# Patient Record
Sex: FEMALE | Race: WHITE | Employment: STUDENT | URBAN - METROPOLITAN AREA
[De-identification: names, ages, dates, MRNs, and addresses within clinical notes are randomized per-mention and may not be internally consistent; named-entity substitution may affect disease eponyms.]

---

## 2017-07-20 ENCOUNTER — APPOINTMENT (OUTPATIENT)
Dept: GENERAL RADIOLOGY | Facility: CLINIC | Age: 22
End: 2017-07-20
Attending: EMERGENCY MEDICINE
Payer: COMMERCIAL

## 2017-07-20 ENCOUNTER — HOSPITAL ENCOUNTER (EMERGENCY)
Facility: CLINIC | Age: 22
Discharge: HOME | End: 2017-07-21
Attending: EMERGENCY MEDICINE | Admitting: EMERGENCY MEDICINE
Payer: COMMERCIAL

## 2017-07-20 DIAGNOSIS — M25.522 LEFT ELBOW PAIN: ICD-10-CM

## 2017-07-20 DIAGNOSIS — T07.XXXA ABRASIONS OF MULTIPLE SITES: ICD-10-CM
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
## 2017-07-20 DIAGNOSIS — S09.90XA CLOSED HEAD INJURY, INITIAL ENCOUNTER: ICD-10-CM

## 2017-07-20 PROCEDURE — 25000132 ZZH RX MED GY IP 250 OP 250 PS 637: Performed by: EMERGENCY MEDICINE

## 2017-07-20 PROCEDURE — 73080 X-RAY EXAM OF ELBOW: CPT | Mod: LT

## 2017-07-20 PROCEDURE — 99282 EMERGENCY DEPT VISIT SF MDM: CPT | Mod: Z6 | Performed by: EMERGENCY MEDICINE

## 2017-07-20 PROCEDURE — 99283 EMERGENCY DEPT VISIT LOW MDM: CPT | Performed by: EMERGENCY MEDICINE

## 2017-07-20 PROCEDURE — 73090 X-RAY EXAM OF FOREARM: CPT | Mod: LT

## 2017-07-20 RX ORDER — ACETAMINOPHEN 325 MG/1
975 TABLET ORAL ONCE
Status: COMPLETED | OUTPATIENT
Start: 2017-07-20 | End: 2017-07-20

## 2017-07-20 RX ORDER — IBUPROFEN 600 MG/1
600 TABLET, FILM COATED ORAL ONCE
Status: COMPLETED | OUTPATIENT
Start: 2017-07-20 | End: 2017-07-20

## 2017-07-20 RX ADMIN — ACETAMINOPHEN 975 MG: 325 TABLET, FILM COATED ORAL at 23:17

## 2017-07-20 RX ADMIN — IBUPROFEN 600 MG: 600 TABLET ORAL at 23:17

## 2017-07-20 ASSESSMENT — ENCOUNTER SYMPTOMS
ABDOMINAL PAIN: 0
NAUSEA: 1
SHORTNESS OF BREATH: 0
WOUND: 1
FEVER: 0

## 2017-07-20 NOTE — ED AVS SNAPSHOT
[facility], [place], Emergency Department    [address] [place] [address]    Phone:  [phone]                                       Kiah Beaver   MRN: [id number]    Department:  [facility], Emergency Department   Date of Visit:  7/20/2017           After Visit Summary Signature Page     I have received my discharge instructions, and my questions have been answered. I have discussed any challenges I see with this plan with the nurse or doctor.    ..........................................................................................................................................  Patient/Patient Representative Signature      ..........................................................................................................................................  Patient Representative Print Name and Relationship to Patient    ..................................................               ................................................  Date                                            Time    ..........................................................................................................................................  Reviewed by Signature/Title    ...................................................              ..............................................  Date                                                            Time

## 2017-07-20 NOTE — ED AVS SNAPSHOT
[facility], Emergency Department    [address] [facility] [address]    Phone:  [phone]                                       Kiah Beaver   MRN: [id number]    Department:  [facility], Emergency Department   Date of Visit:  7/20/2017           Patient Information     Date Of Birth          1995        Your diagnoses for this visit were:     Closed head injury, initial encounter     Abrasions of multiple sites     Fall, initial encounter     Left elbow pain        You were seen by Jorge Mathew MD.        Discharge Instructions         Treatment for Mild Traumatic Brain Injury (Concussion)  A mild traumatic brain injury (TBI) is a sudden jolt to your head that causes a temporary change in the way your brain works. It could be caused by a blow to your head, a blast, or a sudden and severe movement of your head that bounces your brain inside your skull. Falls, fights, sports, and car accidents also can cause TBIs.  Treatment of mild TBI   Having a mild TBI can change the way you feel, act, move, and think. Even though you may look fine, a mild TBI can have a big impact on many areas of your life. A mild TBI can cause headaches, fatigue, memory problems, mood swings, and inability to focus your thoughts.  Treatment for mild TBI may be different, depending on symptoms and other unrelated medical issues; therefore, no 2 TBIs are the same. You may need to work with a TBI team -- a group of healthcare providers who help people recover from TBI. For example, you might work with a physical therapist to help with your balance and movement problems. Or you might work with an occupational therapist to help you function better at home and at work. Other medical experts may help you with emotional and thinking problems.  In some cases, your doctor may use medicine to ease symptoms while you recover. These may include pain relievers, antidepressants, antianxiety medicine, sleep aids, and muscle  relaxants. Although medicines can help, they are not a main part of treatment. You should not take any medicines unless discussed and approved by your healthcare provider. Things that you can do for yourself are usually as important as the medicines you are prescribed. This part of your treatment is called self-management.  Self-management for mild TBI  Most people with mild TBI recover completely, but it may take weeks or months. For some people, symptoms may continue for years. Because of this, self-management may continue long after you leave the hospital. Many lifestyle changes that help your brain recover are good habits that you should keep up even after you have recovered. Here are some tips:      Learn as much as you can about TBI. Share what you learn with friends and family.    Let your health care team know about all your symptoms.    Eat a healthy diet and drink plenty of fluids.    Get plenty of sleep.    Don t overexert yourself mentally or physically.    Don t smoke, take drugs, or drink alcohol.    Don t use caffeine or energy drinks as a way to make you feel less tired.    Avoid activities that could cause another jolt to your head. Don't return to sports or any activity that could cause you to hit your head until all symptoms are gone and you have been cleared by your doctor. A second head injury before fully recovering from the first one can lead to serious brain injury. Ask your health care provider what types of activities are safe.    Avoid mental stress. Learn some relaxation techniques like deep breathing.    Keep a pad and pencil handy. Write things down if you are having trouble concentrating or remembering.  Let your healthcare provider know if your symptoms are getting worse. And look out for other important mental symptoms. These include memory loss, having a hard time thinking clearly, having trouble controlling your emotions, and depression. Also be sure to report physical symptoms  such as worsening headaches, loss of balance, changes in vision, seizures, and vomiting.  Recovery from a mild TBI takes time. Be patient and give your brain time to heal. Be sure to rely on your support system, which includes friends and family members who understand what you are going through. You might also want to join a support group and share your feelings with others who have had a TBI.    Date Last Reviewed: 8/17/2015 2000-2017 [employer]. [address] [place] [address], [place] [address]. All rights reserved. This information is not intended as a substitute for professional medical care. Always follow your healthcare professional's instructions.          Upper Extremity Contusion  You have a contusion (bruise) of an upper extremity (arm, wrist, hand, or fingers). Symptoms include pain, swelling, and skin discoloration. No bones are broken. This injury may take from a few days to a few weeks to heal.  During that time, the bruise may change from reddish in color, to purple-blue, to green-yellow, to yellow-brown.  Home care    Unless another medication was prescribed, you can take acetaminophen, ibuprofen, or naproxen to control pain. (If you have chronic liver or kidney disease or ever had a stomach ulcer or GI bleeding, talk with your doctor before using these medicines.)    Elevate the injured area to reduce pain and swelling. As much as possible, sit or lie down with the injured area raised about the level of your heart. This is especially important during the first 48 hours.    Ice the injured area to help reduce pain and swelling. Wrap a cold source (ice pack or ice cubes in a plastic bag) in a thin towel. Apply to the bruised area for 20 minutes every 1 to 2 hours the first day. Continue this 3 to 4 times a day until the pain and swelling goes away.    If a sling was provided, you may remove it to shower or bathe. To prevent joint stiffness, do not wear it for more than one week.  Follow  up  Follow up with your health care provider or our staff as advised. Call if you are not improving within the next 1 to 2 weeks.  When to seek medical advice   Call your health care provider right away if any of these occur:    Increased pain or swelling    Hand or fingers become cold, blue, numb or tingly    Signs of infection: Warmth, drainage, or increased redness or pain around the injury    Inability to move the injured body part     Frequent bruising for unknown reasons  Date Last Reviewed: 4/29/2015 2000-2017 [employer]. [address] [place] [address]. All rights reserved. This information is not intended as a substitute for professional medical care. Always follow your healthcare professional's instructions.          R.I.C.E.    R.I.C.E. stands for Rest, Ice, Compression, and Elevation. Doing these things helps limit pain and swelling after an injury. R.I.C.E. also helps injuries heal faster. Use R.I.C.E. for sprains, strains, and severe bruises or bumps. Follow the tips on this handout and begin R.I.C.E. as soon as possible after an injury.  ? Rest  Pain is your body s way of telling you to rest an injured area. Whether you have hurt an elbow, hand, foot, or knee, limiting its use will prevent further injury and help you heal.  ? Ice  Applying ice right after an injury helps prevent swelling and reduce pain. Don t place ice directly on your skin.    Wrap a cold pack or bag of ice in a thin cloth. Place it over the injured area.    Ice for 10 minutes every 3 hours. Don t ice for more than 20 minutes at a time.  ? Compression  Putting pressure (compression) on an injury helps prevent swelling and provides support.    Wrap the injured area firmly with an elastic bandage. If your hand or foot tingles, becomes discolored, or feels cold to the touch, the bandage may be too tight. Rewrap it more loosely.    If your bandage becomes too loose, rewrap it.    Do not wear an elastic  bandage overnight.  ? Elevation  Keeping an injury elevated helps reduce swelling, pain, and throbbing. Elevation is most effective when the injury is kept elevated higher than the heart.     Call your healthcare provider if you notice any of the following:    Fingers or toes feel numb, are cold to the touch, or change color    Skin looks shiny or tight    Pain, swelling, or bruising worsens and is not improved with elevation   Date Last Reviewed: 9/3/2015    6935-2275 The "[employer]. [address] [place] [address], [place] [address]. All rights reserved. This information is not intended as a substitute for professional medical care. Always follow your healthcare professional's instructions.          24 Hour Appointment Hotline       To make an appointment at any [facility], call [phone] ([phone]). If you don't have a family doctor or clinic, we will help you find one. [facility] are conveniently located to serve the needs of you and your family.             Review of your medicines      Our records show that you are taking the medicines listed below. If these are incorrect, please call your family doctor or clinic.        Dose / Directions Last dose taken    EFFEXOR PO   Dose:  150 mg        Take 150 mg by mouth daily   Refills:  0        TRI-ESTARYLLA PO        Refills:  0        XANAX PO   Dose:  0.25 mg        Take 0.25 mg by mouth every 4 hours as needed for anxiety   Refills:  0                Procedures and tests performed during your visit     Elbow  XR, G/E 3 views, left    Radius/Ulna XR,  PA &LAT, left      Orders Needing Specimen Collection     None      Pending Results     Date and Time Order Name Status Description    7/20/2017 2235 Radius/Ulna XR,  PA &LAT, left Preliminary     7/20/2017 2235 Elbow  XR, G/E 3 views, left Preliminary             Pending Culture Results     No orders found for last 3 day(s).            Pending Results Instructions     If you had any lab  "results that were not finalized at the time of your Discharge, you can call the ED Lab Result RN at [phone]. You will be contacted by this team for any positive Lab results or changes in treatment. The nurses are available 7 days a week from 10A to 6:30P.  You can leave a message 24 hours per day and they will return your call.        Thank you for choosing [facility]       Thank you for choosing [facility] for your care. Our goal is always to provide you with excellent care. Hearing back from our patients is one way we can continue to improve our services. Please take a few minutes to complete the written survey that you may receive in the mail after you visit with us. Thank you!        [employer] Information     [employer] lets you send messages to your doctor, view your test results, renew your prescriptions, schedule appointments and more. To sign up, go to [email or web address] . Click on \"Log in\" on the left side of the screen, which will take you to the Welcome page. Then click on \"Sign up Now\" on the right side of the page.     You will be asked to enter the access code listed below, as well as some personal information. Please follow the directions to create your username and password.     Your access code is: [email or web address]  Expires: 10/18/2017 11:59 PM     Your access code will  in 90 days. If you need help or a new code, please call your [facility] clinic or [phone].        Care EveryWhere ID     This is your Care EveryWhere ID. This could be used by other organizations to access your [facility] medical records  [id number]        Equal Access to Services     [facility] CenterJENNIFER : Hadii rema Ballard, waaxda luqadaha, qaybta kaalluis pfeiffer . So [facility] [phone].    ATENCIÓN: Si habla español, tiene a rod disposición servicios gratuitos de asistencia lingüística. Llame al [phone].    We comply with applicable federal civil rights laws and [place] " laws. We do not discriminate on the basis of race, color, national origin, age, disability sex, sexual orientation or gender identity.            After Visit Summary       This is your record. Keep this with you and show to your community pharmacist(s) and doctor(s) at your next visit.

## 2017-07-21 VITALS
TEMPERATURE: 98 F | WEIGHT: 126.54 LBS | SYSTOLIC BLOOD PRESSURE: 115 MMHG | HEIGHT: 64 IN | RESPIRATION RATE: 16 BRPM | OXYGEN SATURATION: 96 % | HEART RATE: 86 BPM | DIASTOLIC BLOOD PRESSURE: 81 MMHG | BODY MASS INDEX: 21.6 KG/M2

## 2017-07-21 NOTE — DISCHARGE INSTRUCTIONS
Treatment for Mild Traumatic Brain Injury (Concussion)  A mild traumatic brain injury (TBI) is a sudden jolt to your head that causes a temporary change in the way your brain works. It could be caused by a blow to your head, a blast, or a sudden and severe movement of your head that bounces your brain inside your skull. Falls, fights, sports, and car accidents also can cause TBIs.  Treatment of mild TBI   Having a mild TBI can change the way you feel, act, move, and think. Even though you may look fine, a mild TBI can have a big impact on many areas of your life. A mild TBI can cause headaches, fatigue, memory problems, mood swings, and inability to focus your thoughts.  Treatment for mild TBI may be different, depending on symptoms and other unrelated medical issues; therefore, no 2 TBIs are the same. You may need to work with a TBI team -- a group of healthcare providers who help people recover from TBI. For example, you might work with a physical therapist to help with your balance and movement problems. Or you might work with an occupational therapist to help you function better at home and at work. Other medical experts may help you with emotional and thinking problems.  In some cases, your doctor may use medicine to ease symptoms while you recover. These may include pain relievers, antidepressants, antianxiety medicine, sleep aids, and muscle relaxants. Although medicines can help, they are not a main part of treatment. You should not take any medicines unless discussed and approved by your healthcare provider. Things that you can do for yourself are usually as important as the medicines you are prescribed. This part of your treatment is called self-management.  Self-management for mild TBI  Most people with mild TBI recover completely, but it may take weeks or months. For some people, symptoms may continue for years. Because of this, self-management may continue long after you leave the hospital. Many  lifestyle changes that help your brain recover are good habits that you should keep up even after you have recovered. Here are some tips:      Learn as much as you can about TBI. Share what you learn with friends and family.    Let your health care team know about all your symptoms.    Eat a healthy diet and drink plenty of fluids.    Get plenty of sleep.    Don t overexert yourself mentally or physically.    Don t smoke, take drugs, or drink alcohol.    Don t use caffeine or energy drinks as a way to make you feel less tired.    Avoid activities that could cause another jolt to your head. Don't return to sports or any activity that could cause you to hit your head until all symptoms are gone and you have been cleared by your doctor. A second head injury before fully recovering from the first one can lead to serious brain injury. Ask your health care provider what types of activities are safe.    Avoid mental stress. Learn some relaxation techniques like deep breathing.    Keep a pad and pencil handy. Write things down if you are having trouble concentrating or remembering.  Let your healthcare provider know if your symptoms are getting worse. And look out for other important mental symptoms. These include memory loss, having a hard time thinking clearly, having trouble controlling your emotions, and depression. Also be sure to report physical symptoms such as worsening headaches, loss of balance, changes in vision, seizures, and vomiting.  Recovery from a mild TBI takes time. Be patient and give your brain time to heal. Be sure to rely on your support system, which includes friends and family members who understand what you are going through. You might also want to join a support group and share your feelings with others who have had a TBI.    Date Last Reviewed: 8/17/2015 2000-2017 "[employer]. [address] [place] [address], [place] [address]. All rights reserved. This information is not intended as a  substitute for professional medical care. Always follow your healthcare professional's instructions.          Upper Extremity Contusion  You have a contusion (bruise) of an upper extremity (arm, wrist, hand, or fingers). Symptoms include pain, swelling, and skin discoloration. No bones are broken. This injury may take from a few days to a few weeks to heal.  During that time, the bruise may change from reddish in color, to purple-blue, to green-yellow, to yellow-brown.  Home care    Unless another medication was prescribed, you can take acetaminophen, ibuprofen, or naproxen to control pain. (If you have chronic liver or kidney disease or ever had a stomach ulcer or GI bleeding, talk with your doctor before using these medicines.)    Elevate the injured area to reduce pain and swelling. As much as possible, sit or lie down with the injured area raised about the level of your heart. This is especially important during the first 48 hours.    Ice the injured area to help reduce pain and swelling. Wrap a cold source (ice pack or ice cubes in a plastic bag) in a thin towel. Apply to the bruised area for 20 minutes every 1 to 2 hours the first day. Continue this 3 to 4 times a day until the pain and swelling goes away.    If a sling was provided, you may remove it to shower or bathe. To prevent joint stiffness, do not wear it for more than one week.  Follow up  Follow up with your health care provider or our staff as advised. Call if you are not improving within the next 1 to 2 weeks.  When to seek medical advice   Call your health care provider right away if any of these occur:    Increased pain or swelling    Hand or fingers become cold, blue, numb or tingly    Signs of infection: Warmth, drainage, or increased redness or pain around the injury    Inability to move the injured body part     Frequent bruising for unknown reasons  Date Last Reviewed: 4/29/2015 2000-2017 [employer]. [address] [facility],  [place] [address]. All rights reserved. This information is not intended as a substitute for professional medical care. Always follow your healthcare professional's instructions.          R.I.C.E.    R.I.C.E. stands for Rest, Ice, Compression, and Elevation. Doing these things helps limit pain and swelling after an injury. R.I.C.E. also helps injuries heal faster. Use R.I.C.E. for sprains, strains, and severe bruises or bumps. Follow the tips on this handout and begin R.I.C.E. as soon as possible after an injury.  ? Rest  Pain is your body s way of telling you to rest an injured area. Whether you have hurt an elbow, hand, foot, or knee, limiting its use will prevent further injury and help you heal.  ? Ice  Applying ice right after an injury helps prevent swelling and reduce pain. Don t place ice directly on your skin.    Wrap a cold pack or bag of ice in a thin cloth. Place it over the injured area.    Ice for 10 minutes every 3 hours. Don t ice for more than 20 minutes at a time.  ? Compression  Putting pressure (compression) on an injury helps prevent swelling and provides support.    Wrap the injured area firmly with an elastic bandage. If your hand or foot tingles, becomes discolored, or feels cold to the touch, the bandage may be too tight. Rewrap it more loosely.    If your bandage becomes too loose, rewrap it.    Do not wear an elastic bandage overnight.  ? Elevation  Keeping an injury elevated helps reduce swelling, pain, and throbbing. Elevation is most effective when the injury is kept elevated higher than the heart.     Call your healthcare provider if you notice any of the following:    Fingers or toes feel numb, are cold to the touch, or change color    Skin looks shiny or tight    Pain, swelling, or bruising worsens and is not improved with elevation   Date Last Reviewed: 9/3/2015    8891-6358 [employer]. [address] [place] [address], [place] [address]. All rights reserved. This information  is not intended as a substitute for professional medical care. Always follow your healthcare professional's instructions.
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
## 2017-08-10 ENCOUNTER — HOSPITAL ENCOUNTER (EMERGENCY)
Facility: CLINIC | Age: 22
Discharge: HOME | End: 2017-08-10
Attending: EMERGENCY MEDICINE | Admitting: EMERGENCY MEDICINE
Payer: COMMERCIAL

## 2017-08-10 VITALS
TEMPERATURE: 97 F | SYSTOLIC BLOOD PRESSURE: 122 MMHG | DIASTOLIC BLOOD PRESSURE: 77 MMHG | HEART RATE: 77 BPM | WEIGHT: 127.2 LBS | RESPIRATION RATE: 20 BRPM | OXYGEN SATURATION: 99 % | BODY MASS INDEX: 21.72 KG/M2 | HEIGHT: 64 IN

## 2017-08-10 DIAGNOSIS — F32.2 MDD (MAJOR DEPRESSIVE DISORDER), SEVERE (H): ICD-10-CM

## 2017-08-10 LAB
AMPHETAMINES UR QL SCN: NORMAL
BARBITURATES UR QL: NORMAL
BENZODIAZ UR QL: NORMAL
CANNABINOIDS UR QL SCN: NORMAL
COCAINE UR QL: NORMAL
ETHANOL UR QL SCN: NORMAL
HCG UR QL: NEGATIVE
OPIATES UR QL SCN: NORMAL

## 2017-08-10 PROCEDURE — 80307 DRUG TEST PRSMV CHEM ANLYZR: CPT | Performed by: FAMILY MEDICINE

## 2017-08-10 PROCEDURE — 90791 PSYCH DIAGNOSTIC EVALUATION: CPT

## 2017-08-10 PROCEDURE — 99284 EMERGENCY DEPT VISIT MOD MDM: CPT | Mod: Z6 | Performed by: EMERGENCY MEDICINE

## 2017-08-10 PROCEDURE — 80320 DRUG SCREEN QUANTALCOHOLS: CPT | Performed by: FAMILY MEDICINE

## 2017-08-10 PROCEDURE — 81025 URINE PREGNANCY TEST: CPT | Performed by: FAMILY MEDICINE

## 2017-08-10 PROCEDURE — 99285 EMERGENCY DEPT VISIT HI MDM: CPT | Mod: 25

## 2017-08-10 ASSESSMENT — ENCOUNTER SYMPTOMS
SHORTNESS OF BREATH: 0
FEVER: 0
ABDOMINAL PAIN: 0

## 2017-08-10 NOTE — ED AVS SNAPSHOT
[facility], Emergency Department    [address]    [place] [address]    Phone:  [phone]    Fax:  [phone]                                       Kiah Beaver   MRN: [id number]    Department:  [facility], Emergency Department   Date of Visit:  8/10/2017           Patient Information     Date Of Birth          1995        Your diagnoses for this visit were:     MDD (major depressive disorder), severe (H)        You were seen by Landen Toro MD.        Discharge Instructions         Depression  Depression is one of the most common mental health problems today. It is not just a state of unhappiness or sadness. It is a true disease. The cause seems to be related to a decrease in chemicals that transmit signals in the brain. Having a family history of depression, alcoholism, or suicide increases the risk. Chronic illness, chronic pain, migraine headaches and high emotional stress also increase the risk.  Depression is something we tend to recognize in others, but may have a hard time seeing in ourselves. It can show in many physical and emotional ways:    Loss of appetite    Over-eating    Not being able to sleep    Sleeping too much    Tiredness not related to physical exertion    Restlessness or irritability    Slowness of movement or speech    Feeling depressed or withdrawn    Loss of interest in things you once enjoyed    Trouble concentrating, poor memory, trouble making decisions    Thoughts of harming or killing oneself, or thoughts that life is not worth living    Low self-esteem  The treatment for depression may include both medicine and psychotherapy. Antidepressants can reduce suffering and can improve the ability to function during the depressed period. Therapy can offer emotional support and help you understand emotional factors that may be causing the depression.  Home care    On-going care and support helps people manage this disease.  Find a healthcare provider and  therapist who meet your needs. Seek help when you feel like you may be getting ill.    Be kind to yourself. Make it a point to do things that you enjoy (gardening, walking in nature, going to a movie, etc.). Reward yourself for small successes.    Take care of your physical body. Eat a balanced diet (low in saturated fat and high in fruits and vegetables). Exercise at least 3 times a week for 30 minutes. Even mild-moderate exercise (like brisk walking) can make you feel better.    Avoid alcohol, which can make depression worse.    Take medicine as prescribed.    Tell each of your healthcare providers about all of the prescription drugs, over-the-counter medicines, vitamins, and supplements you take. Certain supplements interact with medicines and can result in dangerous side effects. Ask your pharmacist when you have questions about drug interactions.    Talk with your family and trusted friends about your feelings and thoughts. Ask them to help you recognize behavior changes early so you can get help and, if needed, medicine can be adjusted.  Follow-up care  Follow up with your healthcare provider, or as advised.  Call 911  Call 911 if you:    Have suicidal thoughts, a suicide plan, and the means to carry out the plan    Have trouble breathing    Are very confused    Feel very drowsy or have trouble awakening    Faint or lose consciousness    Have new chest pain that becomes more severe, lasts longer, or spreads into your shoulder, arm, neck, jaw or back  When to seek medical advice  Call your healthcare provider right away if any of these occur:    Feeling extreme depression, fear, anxiety, or anger toward yourself or others    Feeling out of control    Feeling that you may try to harm yourself or another    Hearing voices that others do not hear    Seeing things that others do not see    Can t sleep or eat for 3 days in a row    Friends or family express concern over your behavior and ask you to seek help  Date  Last Reviewed: 9/29/2015 2000-2017 [employer] [address] [place] [address], [place] [address]. All rights reserved. This information is not intended as a substitute for professional medical care. Always follow your healthcare professional's instructions.          24 Hour Appointment Hotline       To make an appointment at any [facility] clinic, call [phone] ([phone]). If you don't have a family doctor or clinic, we will help you find one. [facility] clinics are conveniently located to serve the needs of you and your family.             Review of your medicines      Our records show that you are taking the medicines listed below. If these are incorrect, please call your family doctor or clinic.        Dose / Directions Last dose taken    EFFEXOR PO   Dose:  225 mg        Take 225 mg by mouth daily   Refills:  0        TRI-ESTARYLLA PO        Refills:  0        XANAX PO   Dose:  0.25 mg        Take 0.25 mg by mouth every 4 hours as needed for anxiety   Refills:  0                Procedures and tests performed during your visit     Drug abuse screen 6 urine (tox)    HCG qualitative urine      Orders Needing Specimen Collection     None      Pending Results     No orders found from 8/8/2017 to 8/11/2017.            Pending Culture Results     No orders found from 8/8/2017 to 8/11/2017.            Pending Results Instructions     If you had any lab results that were not finalized at the time of your Discharge, you can call the ED Lab Result RN at [phone]. You will be contacted by this team for any positive Lab results or changes in treatment. The nurses are available 7 days a week from 10A to 6:30P.  You can leave a message 24 hours per day and they will return your call.        Thank you for choosing [facility]       Thank you for choosing [facility] for your care. Our goal is always to provide you with excellent care. Hearing back from our patients is one way we can continue to improve our  "services. Please take a few minutes to complete the written survey that you may receive in the mail after you visit with us. Thank you!        [facility] [employer] Information     [employer] lets you send messages to your doctor, view your test results, renew your prescriptions, schedule appointments and more. To sign up, go to [email or web address] . Click on \"Log in\" on the left side of the screen, which will take you to the Welcome page. Then click on \"Sign up Now\" on the right side of the page.     You will be asked to enter the access code listed below, as well as some personal information. Please follow the directions to create your username and password.     Your access code is: [email or web address]  Expires: 10/18/2017 11:59 PM     Your access code will  in 90 days. If you need help or a new code, please call your [facility] clinic or [phone].        Care EveryWhere ID     This is your Care EveryWhere ID. This could be used by other organizations to access your [facility] medical records  [id number]        Equal Access to Services     [facility] MedicineJENNIFER : Hadii rema Ballard, waaxda luberthaadaha, qaybta kaalmarzena morris, luis morrow . So [facility] [phone].    ATENCIÓN: Si habla español, tiene a rod disposición servicios gratuitos de asistencia lingüística. Leena al [phone].    We comply with applicable federal civil rights laws and [place] laws. We do not discriminate on the basis of race, color, national origin, age, disability sex, sexual orientation or gender identity.            After Visit Summary       This is your record. Keep this with you and show to your community pharmacist(s) and doctor(s) at your next visit.                  "
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
## 2017-08-10 NOTE — ED AVS SNAPSHOT
[facility], [place], Emergency Department    [address]    [facility] [address]    Phone:  [phone]    Fax:  [phone]                                       Kiah Beaver   MRN: [id number]    Department:  [facility], Emergency Department   Date of Visit:  8/10/2017           After Visit Summary Signature Page     I have received my discharge instructions, and my questions have been answered. I have discussed any challenges I see with this plan with the nurse or doctor.    ..........................................................................................................................................  Patient/Patient Representative Signature      ..........................................................................................................................................  Patient Representative Print Name and Relationship to Patient    ..................................................               ................................................  Date                                            Time    ..........................................................................................................................................  Reviewed by Signature/Title    ...................................................              ..............................................  Date                                                            Time

## 2017-08-10 NOTE — DISCHARGE INSTRUCTIONS

## 2017-08-29 ENCOUNTER — HOSPITAL ENCOUNTER (EMERGENCY)
Facility: CLINIC | Age: 22
Discharge: HOME | End: 2017-08-29
Attending: EMERGENCY MEDICINE | Admitting: EMERGENCY MEDICINE
Payer: COMMERCIAL

## 2017-08-29 VITALS
WEIGHT: 125 LBS | SYSTOLIC BLOOD PRESSURE: 131 MMHG | BODY MASS INDEX: 21.34 KG/M2 | DIASTOLIC BLOOD PRESSURE: 94 MMHG | OXYGEN SATURATION: 100 % | HEART RATE: 95 BPM | RESPIRATION RATE: 16 BRPM | HEIGHT: 64 IN | TEMPERATURE: 98 F

## 2017-08-29 DIAGNOSIS — T74.21XA SEXUAL ASSAULT OF ADULT, INITIAL ENCOUNTER: ICD-10-CM

## 2017-08-29 PROCEDURE — 99285 EMERGENCY DEPT VISIT HI MDM: CPT

## 2017-08-29 PROCEDURE — 99283 EMERGENCY DEPT VISIT LOW MDM: CPT | Mod: Z6 | Performed by: EMERGENCY MEDICINE

## 2017-08-29 ASSESSMENT — ENCOUNTER SYMPTOMS
FEVER: 0
SHORTNESS OF BREATH: 0
ABDOMINAL PAIN: 0
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
## 2017-08-29 NOTE — ED NOTES
Pt verbalized understanding of all d/c instructions. Pt reported she felt safe d/c'ing home. Pt is being driven home by . SARs RN has okay'd d/c of patient per her stand point.

## 2017-08-29 NOTE — ED AVS SNAPSHOT
[facility], [place], Emergency Department    [address] [place] [address]    Phone:  [phone]                                       Kiah Beaver   MRN: [id number]    Department:  [facility], Emergency Department   Date of Visit:  8/29/2017           After Visit Summary Signature Page     I have received my discharge instructions, and my questions have been answered. I have discussed any challenges I see with this plan with the nurse or doctor.    ..........................................................................................................................................  Patient/Patient Representative Signature      ..........................................................................................................................................  Patient Representative Print Name and Relationship to Patient    ..................................................               ................................................  Date                                            Time    ..........................................................................................................................................  Reviewed by Signature/Title    ...................................................              ..............................................  Date                                                            Time

## 2017-09-29 ENCOUNTER — HOSPITAL ENCOUNTER (EMERGENCY)
Facility: CLINIC | Age: 22
Discharge: HOME | End: 2017-09-29
Attending: EMERGENCY MEDICINE | Admitting: EMERGENCY MEDICINE
Payer: COMMERCIAL

## 2017-09-29 VITALS
TEMPERATURE: 98.7 F | SYSTOLIC BLOOD PRESSURE: 118 MMHG | RESPIRATION RATE: 16 BRPM | BODY MASS INDEX: 21.15 KG/M2 | WEIGHT: 123.9 LBS | DIASTOLIC BLOOD PRESSURE: 84 MMHG | HEIGHT: 64 IN | HEART RATE: 89 BPM | OXYGEN SATURATION: 98 %

## 2017-09-29 DIAGNOSIS — M79.10 MUSCLE TENDERNESS: ICD-10-CM

## 2017-09-29 DIAGNOSIS — R30.0 DYSURIA: ICD-10-CM

## 2017-09-29 LAB
ALBUMIN UR-MCNC: NEGATIVE MG/DL
AMORPH CRY #/AREA URNS HPF: ABNORMAL /HPF
APPEARANCE UR: ABNORMAL
BACTERIA #/AREA URNS HPF: ABNORMAL /HPF
BILIRUB UR QL STRIP: NEGATIVE
COLOR UR AUTO: ABNORMAL
GLUCOSE UR STRIP-MCNC: NEGATIVE MG/DL
HCG UR QL: NEGATIVE
HGB UR QL STRIP: NEGATIVE
KETONES UR STRIP-MCNC: NEGATIVE MG/DL
LEUKOCYTE ESTERASE UR QL STRIP: ABNORMAL
MUCOUS THREADS #/AREA URNS LPF: PRESENT /LPF
NITRATE UR QL: NEGATIVE
PH UR STRIP: 7 PH (ref 5–7)
RBC #/AREA URNS AUTO: <1 /HPF (ref 0–2)
SOURCE: ABNORMAL
SP GR UR STRIP: 1.01 (ref 1–1.03)
SPECIMEN SOURCE: NORMAL
SQUAMOUS #/AREA URNS AUTO: 2 /HPF (ref 0–1)
UROBILINOGEN UR STRIP-MCNC: NORMAL MG/DL (ref 0–2)
WBC #/AREA URNS AUTO: <1 /HPF (ref 0–2)
WET PREP SPEC: NORMAL

## 2017-09-29 PROCEDURE — 81025 URINE PREGNANCY TEST: CPT | Performed by: EMERGENCY MEDICINE

## 2017-09-29 PROCEDURE — 81001 URINALYSIS AUTO W/SCOPE: CPT | Performed by: EMERGENCY MEDICINE

## 2017-09-29 PROCEDURE — 99284 EMERGENCY DEPT VISIT MOD MDM: CPT | Mod: Z6 | Performed by: EMERGENCY MEDICINE

## 2017-09-29 PROCEDURE — 99284 EMERGENCY DEPT VISIT MOD MDM: CPT | Mod: 25

## 2017-09-29 PROCEDURE — 87491 CHLMYD TRACH DNA AMP PROBE: CPT | Performed by: EMERGENCY MEDICINE

## 2017-09-29 PROCEDURE — 25000128 H RX IP 250 OP 636: Performed by: EMERGENCY MEDICINE

## 2017-09-29 PROCEDURE — 87088 URINE BACTERIA CULTURE: CPT | Performed by: EMERGENCY MEDICINE

## 2017-09-29 PROCEDURE — 96361 HYDRATE IV INFUSION ADD-ON: CPT | Mod: 59

## 2017-09-29 PROCEDURE — 87210 SMEAR WET MOUNT SALINE/INK: CPT | Performed by: EMERGENCY MEDICINE

## 2017-09-29 PROCEDURE — 96375 TX/PRO/DX INJ NEW DRUG ADDON: CPT

## 2017-09-29 PROCEDURE — 87086 URINE CULTURE/COLONY COUNT: CPT | Performed by: EMERGENCY MEDICINE

## 2017-09-29 PROCEDURE — 96374 THER/PROPH/DIAG INJ IV PUSH: CPT

## 2017-09-29 PROCEDURE — 87186 SC STD MICRODIL/AGAR DIL: CPT | Performed by: EMERGENCY MEDICINE

## 2017-09-29 PROCEDURE — 87591 N.GONORRHOEAE DNA AMP PROB: CPT | Performed by: EMERGENCY MEDICINE

## 2017-09-29 RX ORDER — KETOROLAC TROMETHAMINE 30 MG/ML
30 INJECTION, SOLUTION INTRAMUSCULAR; INTRAVENOUS ONCE
Status: COMPLETED | OUTPATIENT
Start: 2017-09-29 | End: 2017-09-29

## 2017-09-29 RX ORDER — SULFAMETHOXAZOLE AND TRIMETHOPRIM 400; 80 MG/1; MG/1
1 TABLET ORAL 2 TIMES DAILY
COMMUNITY
End: 2018-02-26

## 2017-09-29 RX ORDER — ETONOGESTREL AND ETHINYL ESTRADIOL VAGINAL RING .015; .12 MG/D; MG/D
1 RING VAGINAL
COMMUNITY

## 2017-09-29 RX ORDER — PROMETHAZINE HYDROCHLORIDE 25 MG/ML
12.5 INJECTION, SOLUTION INTRAMUSCULAR; INTRAVENOUS ONCE
Status: COMPLETED | OUTPATIENT
Start: 2017-09-29 | End: 2017-09-29

## 2017-09-29 RX ADMIN — PROMETHAZINE HYDROCHLORIDE 12.5 MG: 25 INJECTION INTRAMUSCULAR; INTRAVENOUS at 15:49

## 2017-09-29 RX ADMIN — KETOROLAC TROMETHAMINE 30 MG: 30 INJECTION, SOLUTION INTRAMUSCULAR at 15:51

## 2017-09-29 RX ADMIN — SODIUM CHLORIDE 1000 ML: 9 INJECTION, SOLUTION INTRAVENOUS at 15:49

## 2017-09-29 ASSESSMENT — ENCOUNTER SYMPTOMS
SORE THROAT: 1
ABDOMINAL PAIN: 0
VOMITING: 0
RHINORRHEA: 0
HEADACHES: 1
FREQUENCY: 1
COUGH: 0
NAUSEA: 1
DYSURIA: 1
FEVER: 0

## 2017-09-29 NOTE — ED PROVIDER NOTES
History     Chief Complaint   Patient presents with     Abdominal Pain     Urinary Frequency     Dysuria     Headache     HPI  Kiah Beaver is a 22 year old female with a history of depression and anxiety who presents to the Emergency Department for evaluation of flank pain and dysuria. The patient reports she was recently treated for a UTI with a 5 day course of antibiotics and ended the antibiotics 5 days ago. She is unsure what antibiotic it was and was treated at the [facility] as she is a student at the [employer]. She states her UTI symptoms of dysuria and frequency never fully resolved and she also developed bilateral flank pain with headaches and nausea three days ago. She says she has not been eating very well, but has been drinking lots of fluids. She also states she has recently had mild sore throat. She denies any vomiting, sore throat, rash, chest pain, abdominal pain, congestion, or cough.    Past Medical History:   Diagnosis Date     Anxiety      Depressive disorder      Hearing loss     bilateral     UTI (urinary tract infection)        Past Surgical History:   Procedure Laterality Date     ENT SURGERY      wisdom teeth extraction       Family History   Problem Relation Age of Onset     Bipolar Disorder Maternal Grandmother      Depression Paternal Aunt      Anxiety Disorder Paternal Aunt      Substance Abuse Paternal Aunt      Depression Paternal Aunt      Anxiety Disorder Paternal Aunt      Depression Maternal Uncle      Anxiety Disorder Maternal Uncle      Suicide Maternal Uncle      Substance Abuse Maternal Uncle        Social History   Substance Use Topics     Smoking status: Never Smoker     Smokeless tobacco: Never Used     Alcohol use Yes      Comment: rare       No current facility-administered medications for this encounter.      Current Outpatient Prescriptions   Medication     etonogestrel-ethinyl estradiol (NUVARING) 0.12-0.015 MG/24HR vaginal ring     HYDROXYZINE HCL PO      "AMITRIPTYLINE HCL PO     Venlafaxine HCl (EFFEXOR PO)     sulfamethoxazole-trimethoprim (BACTRIM/SEPTRA) 400-80 MG per tablet     ALPRAZolam (XANAX PO)        Allergies   Allergen Reactions     Ciprofloxacin Nausea     Flu Virus Vaccine      Lamictal [Lamotrigine] Rash         I have reviewed the Medications, Allergies, Past Medical and Surgical History, and Social History in the Epic system.    Review of Systems   Constitutional: Negative for fever.   HENT: Positive for sore throat. Negative for congestion and rhinorrhea.    Respiratory: Negative for cough.    Cardiovascular: Negative for chest pain.   Gastrointestinal: Positive for nausea. Negative for abdominal pain and vomiting.   Genitourinary: Positive for dysuria and frequency.   Skin: Negative for rash.   Neurological: Positive for headaches.   All other systems reviewed and are negative.      Physical Exam   BP: 120/82  Pulse: 91  Temp: 98.7  F (37.1  C)  Resp: 16  Height: 162.6 cm (5' 4\")  Weight: 56.2 kg (123 lb 14.4 oz)  SpO2: 97 %  Physical Exam   Constitutional: She is oriented to person, place, and time.   Young adult female, veery anxious, tearful initially but then during interview she was able to stop crying and answered questions appropriately   HENT:   Head: Normocephalic and atraumatic.   Mouth/Throat: Oropharynx is clear and moist. No oropharyngeal exudate.   Eyes: Pupils are equal, round, and reactive to light. No scleral icterus.   Neck: Normal range of motion. Neck supple.   No meningeal signs, normal ROM   Cardiovascular: Normal rate, regular rhythm, normal heart sounds and intact distal pulses.    No murmur heard.  Pulmonary/Chest: Effort normal and breath sounds normal. No respiratory distress.   Abdominal: Soft. Bowel sounds are normal. She exhibits no distension. There is no tenderness. There is no rebound and no guarding.   Genitourinary:   Genitourinary Comments: Pelvic exam: normal external female genitalia.  Spec exam shows no sign " of cervicitis, no bleeding, no purulent discharge.  No CMT. Normally sized uterus on bimanual exam. No unusual TTP upon bimanual exam.   Musculoskeletal: She exhibits no edema or tenderness.   No midline back TTP.  Some TTP over lumbar paraspinous regions B. No focal CVA TTP   Neurological: She is alert and oriented to person, place, and time. She has normal strength. No cranial nerve deficit or sensory deficit. Coordination normal. GCS eye subscore is 4. GCS verbal subscore is 5. GCS motor subscore is 6.   Skin: Skin is warm. No rash noted. She is not diaphoretic.   Psychiatric:   anxious   Nursing note and vitals reviewed.      ED Course     ED Course       Procedures             Critical Care time:  none             Results for orders placed or performed during the hospital encounter of 09/29/17 (from the past 24 hour(s))   Routine UA with microscopic   Result Value Ref Range    Color Urine Light Yellow     Appearance Urine Slightly Cloudy     Glucose Urine Negative NEG^Negative mg/dL    Bilirubin Urine Negative NEG^Negative    Ketones Urine Negative NEG^Negative mg/dL    Specific Gravity Urine 1.008 1.003 - 1.035    Blood Urine Negative NEG^Negative    pH Urine 7.0 5.0 - 7.0 pH    Protein Albumin Urine Negative NEG^Negative mg/dL    Urobilinogen mg/dL Normal 0.0 - 2.0 mg/dL    Nitrite Urine Negative NEG^Negative    Leukocyte Esterase Urine Small (A) NEG^Negative    Source Catheterized Urine     WBC Urine <1 0 - 2 /HPF    RBC Urine <1 0 - 2 /HPF    Bacteria Urine Few (A) NEG^Negative /HPF    Squamous Epithelial /HPF Urine 2 (H) 0 - 1 /HPF    Mucous Urine Present (A) NEG^Negative /LPF    Amorphous Crystals Few (A) NEG^Negative /HPF   Urine Culture Aerobic Bacterial   Result Value Ref Range    Specimen Description Catheterized Urine     Special Requests Specimen received in preservative     Culture Micro PENDING    HCG qualitative urine   Result Value Ref Range    HCG Qual Urine Negative NEG^Negative   Wet prep    Result Value Ref Range    Specimen Description Genital VAGINAL     Wet Prep No Trichomonas seen     Wet Prep No yeast seen     Wet Prep No clue cells seen     Wet Prep PMNs seen  Few                 Assessments & Plan (with Medical Decision Making)   This is a 22-year-old female who presents to the emergency department today complaining of bilateral flank pain as well as urinary symptoms. Patient reports frequent UTIs and feels like this is similar. Symptoms have been ongoing for two weeks. She was apparently treated for a UTI at another clinic, but she can't remember what the name of the antibiotic was. She denies any abdominal pain, no fevers. No vaginal discharge.  Physical exam is essentially unremarkable. Her neurologic exam is within the normal limits. I am able to palpate deeply in all portions of the abdomen and this does not seem to bother her at all. She has a little bit of diffuse muscular tenderness to palpation over the lumbar paraspinous regions, but nothing in the midline.  Differential diagnosis could certainly include UTI. Pyelonephritis would be possible. Renal colic/nephrolithiasis would be possible. Also need to consider musclular etiology. Patient has not had any abdominal symptoms at this point. She does not have a fever. We did check a urinalysis which shows a small leukocyte esterase with less than 1 white cell and less than 1 red cell. This is certainly not suggestive of UTI. Urine culture was ordered, as the patient has apparently had issues with UTIs in the past. UPT today is negative.   Other etiologies of urinary symptoms, in the absence of the evidence of a UTI, could include cervicitis or STD. I did ask the patient if she would be interested in us doing a pelvic exam and she said that she was interested in doing so, so I did perform a pelvic exam which was unremarkable. Wet prep shows no sign of clue cells, trichomonas or yeast, GC and clamydia are pending. Patient was given IV  fluids here in the ED as well as a dose of Toradol and a dose of Phenergan for her symptoms. Afterwards she felt somewhat improved.    I went in to notify the patient of her normal results.  I advised that if she continues to have urinary symptoms in the absence of infection she should see a urologist.  At that point she informed me that she has already seen urology, evidently she saw urology about 2 weeks ago through the [facility] system.  I reviewed that note, they believed that the dysuria was a post UTI syndrome.  At that point she did have evidence of UTI and it looks like she was put on Septra and was told to take this prophylactically prior to intercourse.    I again reassured her that we see no sign of infection right now, I would strongly recommend that she follow-up with neurology if symptoms persist particularly in the absence of infection.  Patient verbalizes understanding.    This part of the medical record was transcribed by Kathy Galloway Medical Scribe.    I have reviewed the nursing notes.    I have reviewed the findings, diagnosis, plan and need for follow up with the patient.    Discharge Medication List as of 9/29/2017  5:21 PM          Final diagnoses:   Dysuria - without evidence of infection   I, Sylvain Plunkett, am serving as a trained medical scribe to document services personally performed by Noemi García MD, based on the provider's statements to me.      INoemi MD, was physically present and have reviewed and verified the accuracy of this note documented by Sylvain Plunkett.       9/29/2017   [facility], [place], EMERGENCY DEPARTMENT     Noemi García MD  09/29/17 [id number]

## 2017-09-29 NOTE — ED AVS SNAPSHOT
[facility], [place], Emergency Department    [address] [place] [address]    Phone:  [phone]                                       Kiah Beaver   MRN: [id number]    Department:  [facility], Emergency Department   Date of Visit:  9/29/2017           After Visit Summary Signature Page     I have received my discharge instructions, and my questions have been answered. I have discussed any challenges I see with this plan with the nurse or doctor.    ..........................................................................................................................................  Patient/Patient Representative Signature      ..........................................................................................................................................  Patient Representative Print Name and Relationship to Patient    ..................................................               ................................................  Date                                            Time    ..........................................................................................................................................  Reviewed by Signature/Title    ...................................................              ..............................................  Date                                                            Time

## 2017-09-29 NOTE — DISCHARGE INSTRUCTIONS
You have been seen in the ER for some urinary complaints.  You do not have any sign of infection in the urine or in the vagina based on the test results we have checked today.  We recommend drinking plenty of fluids to keep hydrated and follow up with urology if your symptoms do not go away.  Since you have already seen urology through the [facility] system, we would advise following up with them.

## 2017-09-29 NOTE — ED AVS SNAPSHOT
[facility], Emergency Department    [address] [address]    Phone:  [phone]                                       Kiah Beaver   MRN: [id number]    Department:  [facility], Emergency Department   Date of Visit:  9/29/2017           Patient Information     Date Of Birth          1995        Your diagnoses for this visit were:     Dysuria without evidence of infection       You were seen by Noemi García MD.        Discharge Instructions       You have been seen in the ER for some urinary complaints.  You do not have any sign of infection in the urine or in the vagina based on the test results we have checked today.  We recommend drinking plenty of fluids to keep hydrated and follow up with urology if your symptoms do not go away.  Since you have already seen urology through the [facility] system, we would advise following up with them.     Discharge References/Attachments     DYSURIA, UNCERTAIN CAUSE (ADULT) (ENGLISH)      24 Hour Appointment Hotline       To make an appointment at any [facility], call [phone] ([phone]). If you don't have a family doctor or clinic, we will help you find one. [facility] clinics are conveniently located to serve the needs of you and your family.             Review of your medicines      Our records show that you are taking the medicines listed below. If these are incorrect, please call your family doctor or clinic.        Dose / Directions Last dose taken    AMITRIPTYLINE HCL PO        Refills:  0        EFFEXOR PO   Dose:  225 mg        Take 225 mg by mouth daily   Refills:  0        etonogestrel-ethinyl estradiol 0.12-0.015 MG/24HR vaginal ring   Commonly known as:  NUVARING   Dose:  1 each        Place 1 each vaginally every 28 days   Refills:  0        HYDROXYZINE HCL PO   Indication:  Anxiety Neurosis        Take by mouth 4 times daily as needed for itching   Refills:  0        sulfamethoxazole-trimethoprim 400-80  MG per tablet   Commonly known as:  BACTRIM/SEPTRA   Dose:  1 tablet   Indication:  Urinary Tract Infection        Take 1 tablet by mouth 2 times daily   Refills:  0        XANAX PO   Dose:  0.25 mg        Take 0.25 mg by mouth every 4 hours as needed for anxiety   Refills:  0                Procedures and tests performed during your visit     Chlamydia trachomatis PCR    HCG qualitative urine    Neisseria gonorrhoeae PCR    Routine UA with microscopic    Urine Culture Aerobic Bacterial    Wet prep      Orders Needing Specimen Collection     None      Pending Results     Date and Time Order Name Status Description    9/29/2017 1516 Neisseria gonorrhoeae PCR In process     9/29/2017 1516 Chlamydia trachomatis PCR In process     9/29/2017 1328 Urine Culture Aerobic Bacterial Preliminary             Pending Culture Results     Date and Time Order Name Status Description    9/29/2017 1516 Neisseria gonorrhoeae PCR In process     9/29/2017 1516 Chlamydia trachomatis PCR In process     9/29/2017 1328 Urine Culture Aerobic Bacterial Preliminary             Pending Results Instructions     If you had any lab results that were not finalized at the time of your Discharge, you can call the ED Lab Result RN at [phone]. You will be contacted by this team for any positive Lab results or changes in treatment. The nurses are available 7 days a week from 10A to 6:30P.  You can leave a message 24 hours per day and they will return your call.        Thank you for choosing [facility]       Thank you for choosing [facility] for your care. Our goal is always to provide you with excellent care. Hearing back from our patients is one way we can continue to improve our services. Please take a few minutes to complete the written survey that you may receive in the mail after you visit with us. Thank you!        [employer] Information     [employer] lets you send messages to your doctor, view your test results, renew your prescriptions, schedule  "appointments and more. To sign up, go to [email or web address] . Click on \"Log in\" on the left side of the screen, which will take you to the Welcome page. Then click on \"Sign up Now\" on the right side of the page.     You will be asked to enter the access code listed below, as well as some personal information. Please follow the directions to create your username and password.     Your access code is: [email or web address]  Expires: 10/18/2017 11:59 PM     Your access code will  in 90 days. If you need help or a new code, please call your [facility] clinic or [phone].        Care EveryWhere ID     This is your Care EveryWhere ID. This could be used by other organizations to access your [facility] medical records  [id number]        Equal Access to Services     NATALIE PAUL : Eber Ballard, jones mcintyre, jung morris, luis morrow . So [place] [phone].    ATENCIÓN: Si habla español, tiene a rod disposición servicios gratuitos de asistencia lingüística. Llame al [phone].    We comply with applicable federal civil rights laws and [place] laws. We do not discriminate on the basis of race, color, national origin, age, disability, sex, sexual orientation, or gender identity.            After Visit Summary       This is your record. Keep this with you and show to your community pharmacist(s) and doctor(s) at your next visit.                  "

## 2017-09-30 ENCOUNTER — TELEPHONE (OUTPATIENT)
Dept: EMERGENCY MEDICINE | Facility: CLINIC | Age: 22
End: 2017-09-30

## 2017-09-30 DIAGNOSIS — N39.0 URINARY TRACT INFECTION: ICD-10-CM

## 2017-09-30 LAB
BACTERIA SPEC CULT: ABNORMAL
Lab: ABNORMAL
SPECIMEN SOURCE: ABNORMAL

## 2017-09-30 RX ORDER — CEPHALEXIN 500 MG/1
500 CAPSULE ORAL 2 TIMES DAILY
Qty: 14 CAPSULE | Refills: 0 | Status: SHIPPED | OUTPATIENT
Start: 2017-09-30 | End: 2017-10-07

## 2017-09-30 NOTE — TELEPHONE ENCOUNTER
[facility] Lab result notification [Adult-Female]    [facility] ED lab result protocol used  Urine Culture    Reason for call  Notify of lab results, assess symptoms,  review ED providers recommendations/discharge instructions (if necessary) and advise per ED lab result f/u protocol    Lab Result (including Rx patient on, if applicable)  Preliminary urine culture report on 9/30/17 shows the presence of bacteria(s):  50,000 to 100,000 colonies/mL Escherichia coli  [facility] ED discharge antibiotic: None.  Recommendations per [facility] ED Lab result protocol - Urine culture protocol.    Information table from ED Provider visit on 9/29/17  ED diagnosis   Dysuria    ED provider   Noemi García MD    Symptoms reported at ED visit (Chief complaint, HPI) Kiah Beaver is a 22 year old female with a history of depression and anxiety who presents to the Emergency Department for evaluation of flank pain and dysuria. The patient reports she was recently treated for a UTI with a 5 day course of antibiotics and ended the antibiotics 5 days ago. She is unsure what antibiotic it was and was treated at the [facility] as she is a student at the [employer]. She states her UTI symptoms of dysuria and frequency never fully resolved and she also developed bilateral flank pain with headaches and nausea three days ago. She says she has not been eating very well, but has been drinking lots of fluids. She also states she has recently had mild sore throat. She denies any vomiting, sore throat, rash, chest pain, abdominal pain, congestion, or cough.      ED providers Impression and Plan (applicable information) This is a 22-year-old female who presents to the emergency department today complaining of bilateral flank pain as well as urinary symptoms. Patient reports frequent UTIs and feels like this is similar. Symptoms have been ongoing for two weeks. She was apparently treated for a UTI at another clinic,  but she can't remember what the name of the antibiotic was. She denies any abdominal pain, no fevers. No vaginal discharge.  Physical exam is essentially unremarkable. Her neurologic exam is within the normal limits. I am able to palpate deeply in all portions of the abdomen and this does not seem to bother her at all. She has a little bit of diffuse muscular tenderness to palpation over the lumbar paraspinous regions, but nothing in the midline.  Differential diagnosis could certainly include UTI. Pyelonephritis would be possible. Renal colic/nephrolithiasis would be possible. Also need to consider musclular etiology. Patient has not had any abdominal symptoms at this point. She does not have a fever. We did check a urinalysis which shows a small leukocyte esterase with less than 1 white cell and less than 1 red cell. This is certainly not suggestive of UTI. Urine culture was ordered, as the patient has apparently had issues with UTIs in the past. UPT today is negative.   Other etiologies of urinary symptoms, in the absence of the evidence of a UTI, could include cervicitis or STD. I did ask the patient if she would be interested in us doing a pelvic exam and she said that she was interested in doing so, so I did perform a pelvic exam which was unremarkable. Wet prep shows no sign of clue cells, trichomonas or yeast, GC and clamydia are pending. Patient was given IV fluids here in the ED as well as a dose of Toradol and a dose of Phenergan for her symptoms. Afterwards she felt somewhat improved.    I went in to notify the patient of her normal results.  I advised that if she continues to have urinary symptoms in the absence of infection she should see a urologist.  At that point she informed me that she has already seen urology, evidently she saw urology about 2 weeks ago through the [facility] system.  I reviewed that note, they believed that the dysuria was a post UTI syndrome.  At that point  "she did have evidence of UTI and it looks like she was put on Septra and was told to take this prophylactically prior to intercourse.     I again reassured her that we see no sign of infection right now, I would strongly recommend that she follow-up with neurology if symptoms persist particularly in the absence of infection.  Patient verbalizes understanding.   Significant Medical hx, if applicable RONALD, depression, anxiety, suicidal ideation   Coumadin/Warfarin [Yes /No] No   Creatinine Level (mg/dl) Not available   Creatinine clearance (ml/min), if applicable Not available   Pregnant (Yes/No/NA) No   Breastfeeding (Yes/No/NA) No   Allergies Cipro, lamictal, flu virus   Weight, if applicable 56.2 Kg      RN Assessment (Patient s current Symptoms), include time called.  [Insert Left message here if message left]  Kiah reports \"she is okay\".  Is continuing to have dysuria.  Reports she had been on 5 day course of Nitrofurantoin per [facility], which did not clear up symptoms.      RN Recommendations/Instructions per [facility] ED lab result protocol  Patient notified of lab result and treatment recommendations.  Rx for Keflex sent to [Pharmacy - [facility] in [place]].  RN reviewed information about medication being prescribed based on prelim UC and if changes are needed, she will be contacted.  If she is not contacted, she will complete course of Keflex as prescribed.  No questions or concerns verbalized.    Please Contact your PCP clinic or return to the Emergency department if your:    Symptoms return.    Symptoms do not improve after 3 days on antibiotic.    Symptoms do not resolve after completing antibiotic.    Symptoms worsen or other concerning symptom's.    PCP follow-up Questions asked: NO    Temitope Hardin RN    [facility] RN  Lung Nodule and ED Lab Results F/U RN  Epic pool (ED late result f/u RN) : [id number]   # [phone]    Copy of Lab result   Order   Urine Culture Aerobic Bacterial [[id number]] " (Order [id number])   Preliminary Result   Exam Information   Exam Date Exam Time Accession # Results    9/29/17  1:30 PM [id number]    Component Results   Component Collected Lab   Specimen Description 09/29/2017  1:30    Midstream Urine   Special Requests 09/29/2017  1:30 PM 75   Specimen received in preservative   Culture Micro (Abnormal) 09/29/2017  1:30    50,000 to 100,000 colonies/mL   Escherichia coli   Susceptibility testing in progress

## 2017-10-01 LAB
C TRACH DNA SPEC QL NAA+PROBE: NEGATIVE
N GONORRHOEA DNA SPEC QL NAA+PROBE: NEGATIVE
SPECIMEN SOURCE: NORMAL
SPECIMEN SOURCE: NORMAL

## 2017-10-11 ENCOUNTER — MEDICAL CORRESPONDENCE (OUTPATIENT)
Dept: HEALTH INFORMATION MANAGEMENT | Facility: CLINIC | Age: 22
End: 2017-10-11

## 2017-10-12 ENCOUNTER — PRE VISIT (OUTPATIENT)
Dept: UROLOGY | Facility: CLINIC | Age: 22
End: 2017-10-12

## 2017-10-30 ENCOUNTER — PRE VISIT (OUTPATIENT)
Dept: UROLOGY | Facility: CLINIC | Age: 22
End: 2017-10-30

## 2017-10-30 NOTE — TELEPHONE ENCOUNTER
Patient is coming in to see Jo Marino PA-C for UTI's, call left a message to please come with a full bladder.

## 2017-11-01 ENCOUNTER — TRANSFERRED RECORDS (OUTPATIENT)
Dept: HEALTH INFORMATION MANAGEMENT | Facility: CLINIC | Age: 22
End: 2017-11-01

## 2017-11-20 ENCOUNTER — OFFICE VISIT (OUTPATIENT)
Dept: UROLOGY | Facility: CLINIC | Age: 22
End: 2017-11-20

## 2017-11-20 VITALS
DIASTOLIC BLOOD PRESSURE: 75 MMHG | SYSTOLIC BLOOD PRESSURE: 126 MMHG | HEART RATE: 93 BPM | BODY MASS INDEX: 20.49 KG/M2 | WEIGHT: 120 LBS | HEIGHT: 64 IN

## 2017-11-20 DIAGNOSIS — N39.0 RECURRENT UTI: Primary | ICD-10-CM

## 2017-11-20 LAB
ALBUMIN UR-MCNC: NEGATIVE MG/DL
AMORPH CRY #/AREA URNS HPF: ABNORMAL /HPF
APPEARANCE UR: ABNORMAL
BILIRUB UR QL STRIP: NEGATIVE
COLOR UR AUTO: YELLOW
GLUCOSE UR STRIP-MCNC: NEGATIVE MG/DL
HGB UR QL STRIP: NEGATIVE
KETONES UR STRIP-MCNC: NEGATIVE MG/DL
LEUKOCYTE ESTERASE UR QL STRIP: NEGATIVE
MUCOUS THREADS #/AREA URNS LPF: PRESENT /LPF
NITRATE UR QL: NEGATIVE
PH UR STRIP: 7 PH (ref 5–7)
RBC #/AREA URNS AUTO: 3 /HPF (ref 0–2)
SOURCE: ABNORMAL
SP GR UR STRIP: 1.01 (ref 1–1.03)
SQUAMOUS #/AREA URNS AUTO: 2 /HPF (ref 0–1)
UROBILINOGEN UR STRIP-MCNC: 0 MG/DL (ref 0–2)
WBC #/AREA URNS AUTO: 1 /HPF (ref 0–2)

## 2017-11-20 RX ORDER — NITROFURANTOIN 25; 75 MG/1; MG/1
50 CAPSULE ORAL
COMMUNITY
End: 2018-02-26

## 2017-11-20 ASSESSMENT — ENCOUNTER SYMPTOMS
POLYPHAGIA: 0
HEARTBURN: 0
HALLUCINATIONS: 0
PARALYSIS: 0
FEVER: 0
TINGLING: 0
NERVOUS/ANXIOUS: 1
VOMITING: 1
BLOATING: 1
MEMORY LOSS: 0
DIARRHEA: 0
DISTURBANCES IN COORDINATION: 0
WEIGHT LOSS: 0
SPEECH CHANGE: 0
CHILLS: 0
PANIC: 1
POLYDIPSIA: 1
DECREASED APPETITE: 0
NIGHT SWEATS: 0
DYSURIA: 1
FATIGUE: 1
CONSTIPATION: 1
NUMBNESS: 0
SWOLLEN GLANDS: 1
ABDOMINAL PAIN: 1
SEIZURES: 0
HEADACHES: 1
DEPRESSION: 1
INCREASED ENERGY: 0
LOSS OF CONSCIOUSNESS: 0
ALTERED TEMPERATURE REGULATION: 0
WEAKNESS: 0
BRUISES/BLEEDS EASILY: 0
BLOOD IN STOOL: 1
DIFFICULTY URINATING: 1
DECREASED CONCENTRATION: 0
INSOMNIA: 0
HOT FLASHES: 0
JAUNDICE: 0
NAUSEA: 1
DECREASED LIBIDO: 1
TREMORS: 0
WEIGHT GAIN: 0
HEMATURIA: 0
RECTAL PAIN: 0
FLANK PAIN: 0
DIZZINESS: 0
BOWEL INCONTINENCE: 0

## 2017-11-20 ASSESSMENT — PAIN SCALES - GENERAL: PAINLEVEL: MILD PAIN (3)

## 2017-11-20 NOTE — MR AVS SNAPSHOT
After Visit Summary   11/20/2017    Kiah Beaver    MRN: [id number]           Patient Information     Date Of Birth          1995        Visit Information        Provider Department      11/20/2017 9:30 AM Jo Marino PA-C [facility] and [facility]        Today's Diagnoses     Urinary tract infection    -  1      Care Instructions    UROLOGY CLINIC VISIT PATIENT INSTRUCTIONS    1) Continue taking nitrofurantion 50 mg once daily in the evenings.     2) Make sure to wipe front to back, urinate after sexual activity, and drink plenty of water to keep the urine dilute.    3) If you feel you are getting an infection in the meantime, call the number below so we can place orders for you to leave a urine sample.    Follow up with me in 3 months to recheck.     If you have any issues, questions or concerns in the meantime, do not hesitate to contact us at [phone] or via [employer].     It was a pleasure meeting with you today.  Thank you for allowing me and my team the privilege of caring for you today.  YOU are the reason we are here, and I truly hope we provided you with the excellent service you deserve.  Please let us know if there is anything else we can do for you so that we can be sure you are leaving completely satisfied with your care experience.                Follow-ups after your visit        Your next 10 appointments already scheduled     Feb 19, 2018 10:00 AM CST   (Arrive by 9:45 AM)   Return Visit with DINESH Nava [facility] and [facility] ([facility])    [address] [place] [id number] [phone]              Who to contact     Please call your clinic at [phone] to:    Ask questions about your health    Make or cancel appointments    Discuss your medicines    Learn about your test results    Speak to your doctor   If you have  "compliments or concerns about an experience at your clinic, or if you wish to file a complaint, please contact [facility] Patient Relations at [phone] or email us at [email or web address]         Additional Information About Your Visit        Cod[employer] Information     [employer] is an electronic gateway that provides easy, online access to your medical records. With [employer], you can request a clinic appointment, read your test results, renew a prescription or communicate with your care team.     To sign up for [employer] visit the website at [email or web address]   You will be asked to enter the access code listed below, as well as some personal information. Please follow the directions to create your username and password.     Your access code is: [email or web address]  Expires: 2018  6:30 AM     Your access code will  in 90 days. If you need help or a new code, please contact your [facility] Clinic or call [phone] for assistance.        Care EveryWhere ID     This is your Care EveryWhere ID. This could be used by other organizations to access your [place] medical records  [id number]        Your Vitals Were     Pulse Height BMI (Body Mass Index)             93 1.626 m (5' 4\") 20.6 kg/m2          Blood Pressure from Last 3 Encounters:   17 126/75   17 118/84   17 (!) 131/94    Weight from Last 3 Encounters:   17 54.4 kg (120 lb)   17 56.2 kg (123 lb 14.4 oz)   17 56.7 kg (125 lb)              We Performed the Following     Mycoplasma large colony culture     Routine UA with micro reflex to culture     Ureaplasma culture        Primary Care Provider Office Phone # Fax #    Tamiko SONIDO Lockhart [phone]       [facility] [address] [place] [address]        Equal Access to Services     NATALIE PAUL AH: Eber Ballard, waaxda luqaudi, qaybta babak morris, " luis reaveshank boyd'aan ah. So [facility] [phone].    ATENCIÓN: Si habla lady, tiene a rod disposición servicios gratuitos de asistencia lingüística. Leena al [phone].    We comply with applicable federal civil rights laws and [place] laws. We do not discriminate on the basis of race, color, national origin, age, disability, sex, sexual orientation, or gender identity.            Thank you!     Thank you for choosing [facility] AND [facility] PROSTATE [facility] UROLOGIC CANCERS  for your care. Our goal is always to provide you with excellent care. Hearing back from our patients is one way we can continue to improve our services. Please take a few minutes to complete the written survey that you may receive in the mail after your visit with us. Thank you!             Your Updated Medication List - Protect others around you: Learn how to safely use, store and throw away your medicines at www.disposemymeds.org.          This list is accurate as of: 11/20/17 10:30 AM.  Always use your most recent med list.                   Brand Name Dispense Instructions for use Diagnosis    AMITRIPTYLINE HCL PO           EFFEXOR PO      Take 225 mg by mouth daily        etonogestrel-ethinyl estradiol 0.12-0.015 MG/24HR vaginal ring    NUVARING     Place 1 each vaginally every 28 days        HYDROXYZINE HCL PO      Take by mouth 4 times daily as needed for itching        nitroFURantoin (macrocrystal-monohydrate) 100 MG capsule    MACROBID     Take 50 mg by mouth        sulfamethoxazole-trimethoprim 400-80 MG per tablet    BACTRIM/SEPTRA     Take 1 tablet by mouth 2 times daily        XANAX PO      Take 0.25 mg by mouth every 4 hours as needed for anxiety

## 2017-11-20 NOTE — LETTER
11/20/2017     RE: Kiah Beaver  [address]  [place] [address]     Dear Colleague,    Thank you for referring your patient, Kiah Beaver, to the [facility] AND [facility] PROST[facility] UROLOGIC CANCERS at [facility]. Please see a copy of my visit note below.    CC: Recurrent urinary tract infections.    HPI: It is a pleasure to see Ms. Kiah Beaver, a very pleasant 22 year old female seen today in the urology clinic in consultation from CHARANJIT Dwyer, CNP ([facility]) for evaluation of recurrent urinary tract infections. These have been ongoing since January 2017 per patient (she had only 1 UTI prior to that). The patient reports having 5-6 UTI's in the past year. She is not immunosuppressed. Infections are typically symptomatic.  Typical symptoms include: dysuria, frequency, urgency, occasional gross hematuria. She denies abdominal pain, flank pain, fevers, chills. She has no history of stones.  She has never been hospitalized for UTIs.   With infection, Ms. Kiah Beaver typically goes to [facility] or her PCP through [facility] where cultures are performed (see below).  Is usually given antibiotics and reports that symptoms do resolve appropriately with therapy. She occasionally experiences lingering dysuria that takes longer to resolve. She is asymptomatic today. Was recently started on daily suppressive therapy with nitrofurantion 50 mg qhs by her PCP.    Of note, she did see urology through [facility] 9/2017. They prescribed post-coital Bactrim which did not work per patient. Note recommended possible urethral dilation if she continued to have frequent UTI's although physical exam did not comment on any urethral stenosis or other abnormalities.    -Age of first UTI: 21  -Frequency of infections: q1-2 months  -Inciting events/triggers include: sexual activity.    -Daily Fluid intake: moderate amount of water, some  caffeine  -Wipes front to back: yes  -Cranberry supplements: no  -History of antibiotic prophylaxis in the past: yes - tried post-coital Bactrim which did not work; recently started on daily nitrofurantion 50 mg by PCP  -Voiding before and after intercourse: yes  -Bowels: occasional issues with constipation    Review of Diagnostics:  17 - UA: trace protein, trace blood, 3-5 RBC, 3-5 WBC, moderate epith cells --> No culture  17 - UC: 50-100k E. Coli (resistant to Bactrim, o/w pan-sensitive)  17 - wet prep - positive for clue cells (treated with metronidazole)  10/16/17 - UC: 50-100k E. Coli (resistant to Bactrim, o/w pan-sensitive)  17 - UA: small LE, few bacteria --> UC: 50-100k E. Coli (resistant to Bactrim, o/w pan-sensitive)  17 - Wet prep negative; GC/CT negative  17 - UA: small blood, trace protein --> No culture  8/15/17 - UA: large blood, large LE, >100 WBC, 10-24 RBC, moderate bacteria --> UC: 10-50k mixed  josé  16 - UA: large blood, moderate LE, 50-99 WBC, 3-4 RBC --> UC: 50-100k mixed  josé    OBSTETRIC HISTORY:  The patient is . Periods are regular.      Past Medical History:   Diagnosis Date     Anxiety      Depressive disorder      Hearing loss     bilateral     UTI (urinary tract infection)      Past Surgical History:   Procedure Laterality Date     ENT SURGERY      wisdom teeth extraction     Current Outpatient Prescriptions   Medication Sig Dispense Refill     nitroFURantoin, macrocrystal-monohydrate, (MACROBID) 100 MG capsule Take 50 mg by mouth       etonogestrel-ethinyl estradiol (NUVARING) 0.12-0.015 MG/24HR vaginal ring Place 1 each vaginally every 28 days       sulfamethoxazole-trimethoprim (BACTRIM/SEPTRA) 400-80 MG per tablet Take 1 tablet by mouth 2 times daily       HYDROXYZINE HCL PO Take by mouth 4 times daily as needed for itching       AMITRIPTYLINE HCL PO        Venlafaxine HCl (EFFEXOR PO) Take 225 mg by mouth daily        ALPRAZolam  (XANAX PO) Take 0.25 mg by mouth every 4 hours as needed for anxiety       Allergies   Allergen Reactions     Flu Virus Vaccine      Ciprofloxacin Nausea and Rash     Influenza Vaccines Rash     2015 vaccine rash on arm  2015 vaccine rash on arm     Lamictal [Lamotrigine] Rash and Unknown       Family History: There is no h/o  carcinoma.  There is no h/o urolithiasis.    Social History: The patient does not smoke cigarettes, minimal EtOH and no illicit drug use.    ROS:   Answers for HPI/ROS submitted by the patient on 11/20/2017   General Symptoms: Yes  Skin Symptoms: No  HENT Symptoms: No  EYE SYMPTOMS: No  HEART SYMPTOMS: No  LUNG SYMPTOMS: No  INTESTINAL SYMPTOMS: Yes  URINARY SYMPTOMS: Yes  GYNECOLOGIC SYMPTOMS: Yes  BREAST SYMPTOMS: No  SKELETAL SYMPTOMS: No  BLOOD SYMPTOMS: Yes  NERVOUS SYSTEM SYMPTOMS: Yes  MENTAL HEALTH SYMPTOMS: Yes  Fever: No  Loss of appetite: No  Weight loss: No  Weight gain: No  Fatigue: Yes  Night sweats: No  Chills: No  Increased stress: Yes  Excessive hunger: No  Excessive thirst: Yes  Feeling hot or cold when others believe the temperature is normal: No  Loss of height: No  Post-operative complications: No  Surgical site pain: No  Hallucinations: No  Change in or Loss of Energy: No  Hyperactivity: No  Confusion: No  Heart burn or indigestion: No  Nausea: Yes  Vomiting: Yes  Abdominal pain: Yes  Bloating: Yes  Constipation: Yes  Diarrhea: No  Blood in stool: Yes  Black stools: No  Rectal or Anal pain: No  Fecal incontinence: No  Yellowing of skin or eyes: No  Vomit with blood: No  Change in stools: Yes  Trouble holding urine or incontinence: Yes  Pain or burning: Yes  Trouble starting or stopping: Yes  Increased frequency of urination: Yes  Blood in urine: No  Decreased frequency of urination: No  Frequent nighttime urination: Yes  Flank pain: No  Difficulty emptying bladder: Yes  Anemia: No  Swollen glands: Yes  Easy bleeding or bruising: No  Edema or swelling: No  Trouble with  "coordination: No  Dizziness or trouble with balance: No  Fainting or black-out spells: No  Memory loss: No  Headache: Yes  Seizures: No  Speech problems: No  Tingling: No  Tremor: No  Weakness: No  Difficulty walking: No  Paralysis: No  Numbness: No  Bleeding or spotting between periods: No  Heavy or painful periods: No  Irregular periods: No  Vaginal discharge: Yes  Hot flashes: No  Vaginal dryness: No  Genital ulcers: No  Reduced libido: Yes  Painful intercourse: Yes  Difficulty with sexual arousal: Yes  Post-menopausal bleeding: No  Nervous or Anxious: Yes  Depression: Yes  Trouble sleeping: No  Trouble thinking or concentrating: No  Mood changes: No  Panic attacks: Yes      PHYSICAL EXAM:   Blood pressure 126/75, pulse 93, height 1.626 m (5' 4\"), weight 54.4 kg (120 lb), not currently breastfeeding.   Body mass index is 20.6 kg/(m^2).  GENERAL: Well groomed/well developed/well nourished female in NAD.  HEENT: EOMI, AT, NC.  SKIN: Warm to touch, dry.  No visible rashes or lesions.  RESP: No increased respiratory effort.  LYMPH: No LE edema.  MS: Full ROM in extremities.  PELVIC: Deferred for now.   NEURO: Alert and oriented x 3.  PSYCH: Normal mood and affect, pleasant and agreeable during interview and exam.    Admission on 09/29/2017, Discharged on 09/29/2017   Component Date Value Ref Range Status     Color Urine 09/29/2017 Light Yellow   Final     Appearance Urine 09/29/2017 Slightly Cloudy   Final     Glucose Urine 09/29/2017 Negative  NEG^Negative mg/dL Final     Bilirubin Urine 09/29/2017 Negative  NEG^Negative Final     Ketones Urine 09/29/2017 Negative  NEG^Negative mg/dL Final     Specific Gravity Urine 09/29/2017 1.008  1.003 - 1.035 Final     Blood Urine 09/29/2017 Negative  NEG^Negative Final     pH Urine 09/29/2017 7.0  5.0 - 7.0 pH Final     Protein Albumin Urine 09/29/2017 Negative  NEG^Negative mg/dL Final     Urobilinogen mg/dL 09/29/2017 Normal  0.0 - 2.0 mg/dL Final     Nitrite Urine " 09/29/2017 Negative  NEG^Negative Final     Leukocyte Esterase Urine 09/29/2017 Small* NEG^Negative Final     Source 09/29/2017 Catheterized Urine   Final     WBC Urine 09/29/2017 <1  0 - 2 /HPF Final     RBC Urine 09/29/2017 <1  0 - 2 /HPF Final     Bacteria Urine 09/29/2017 Few* NEG^Negative /HPF Final     Squamous Epithelial /HPF Urine 09/29/2017 2* 0 - 1 /HPF Final     Mucous Urine 09/29/2017 Present* NEG^Negative /LPF Final     Amorphous Crystals 09/29/2017 Few* NEG^Negative /HPF Final     Specimen Description 09/29/2017 Midstream Urine   Final     Special Requests 09/29/2017 Specimen received in preservative   Final     Culture Micro 09/29/2017 *  Final                    Value:50,000 to 100,000 colonies/mL  Escherichia coli       HCG Qual Urine 09/29/2017 Negative  NEG^Negative Final    Comment: This test is for screening purposes.  Results should be interpreted along with   the clinical picture.  Confirmation testing is available if warranted by   ordering BTE633, HCG Quantitative Pregnancy.       Specimen Description 09/29/2017 Genital VAGINAL   Final     Wet Prep 09/29/2017 No Trichomonas seen   Final     Wet Prep 09/29/2017 No yeast seen   Final     Wet Prep 09/29/2017 No clue cells seen   Final     Wet Prep 09/29/2017    Final                    Value:PMNs seen  Few       Specimen Description 09/29/2017 Genital   Final    VAGINAL     Chlamydia Trachomatis PCR 09/29/2017 Negative  NEG^Negative Final    Comment: Negative for C. trachomatis rRNA by transcription mediated amplification.  A negative result by transcription mediated amplification does not preclude   the presence of C. trachomatis infection because results are dependent on   proper and adequate collection, absence of inhibitors, and sufficient rRNA to   be detected.       Specimen Descrip 09/29/2017 Genital   Final    VAGINAL     N Gonorrhea PCR 09/29/2017 Negative  NEG^Negative Final    Comment: Negative for N. gonorrhoeae rRNA by  transcription mediated amplification.  A negative result by transcription mediated amplification does not preclude   the presence of N. gonorrhoeae infection because results are dependent on   proper and adequate collection, absence of inhibitors, and sufficient rRNA to   be detected.         IMAGING:   CT A/P w/contrast   11/17/17  IMPRESSION:  1. No acute abnormality identified.  Normal caliber appendix identified in a right lower abdominal quadrant. No acute inflammatory process identified.   2. No hydronephrosis or imaging evidence of upper tract urinary infection.   3. Moderate stool throughout nondilated large bowel.    PVR: Postvoid residual urine volume as measured by ultrasound was 0 mL.    ASSESSMENT/PLAN:  Ms. Kiah Beaver is a 22 year old female with a history of recurrent urinary tract infections, well-documented (see above). Recent CT A/P was unremarkable with no obvious reason for frequent UTI's. Tried post-coital prophylaxis with Bactrim which did not work well. Her PCP recently started her on daily suppressive therapy with nitrofurantoin 50 mg qhs and she is currently asymptomatic. Agree with plan for daily suppressive therapy for duration ~3-6 months. Hopefully this will break her UTI cycle. Plan for additional workup as follows:  -Send urine for UA/UC, ureaplasma, mycoplasma. Treat pending results.  - Lifestyle and hygiene modifications were reviewed today in clinic, including wiping front to back, wearing cotton breathable underwear, voiding before and after intercourse to flush the urethra, minimizing baths and opting for showers, and appropriate perineal hygiene. Push fluids to keep the urine dilute.   - Cranberry supplements or vitamin C are viable supplemental treatment options in attempts of acidifying the urine to make the bladder less inhabitable for bacteria.    Follow up with me in 3 months to recheck. If still getting frequent UTI's, will likely plan to move on for cystoscopy for  intravesical examination.     Should the patient develop symptoms consistent with a urinary tract infection in the interim, I have asked her to call our clinic directly. Nursing staff will then place an order for UA/UC and she may then make a lab only appointment to drop a urine specimen off at any [facility] for convenience. We reviewed the importance of attempting to always leave a urine specimen when symptomatic to trend frequency of infections, causative organisms and drug sensitivities.      I have enjoyed participating in the medical care of this very pleasant patient.  Please don't hesitate to contact me with any questions or concerns.     Jo Marino PA-C  Department of Urology

## 2017-11-20 NOTE — PATIENT INSTRUCTIONS
UROLOGY CLINIC VISIT PATIENT INSTRUCTIONS    1) Continue taking nitrofurantion 50 mg once daily in the evenings.     2) Make sure to wipe front to back, urinate after sexual activity, and drink plenty of water to keep the urine dilute.    3) If you feel you are getting an infection in the meantime, call the number below so we can place orders for you to leave a urine sample.    Follow up with me in 3 months to recheck.     If you have any issues, questions or concerns in the meantime, do not hesitate to contact us at [phone] or via [employer].     It was a pleasure meeting with you today.  Thank you for allowing me and my team the privilege of caring for you today.  YOU are the reason we are here, and I truly hope we provided you with the excellent service you deserve.  Please let us know if there is anything else we can do for you so that we can be sure you are leaving completely satisfied with your care experience.

## 2017-11-20 NOTE — PROGRESS NOTES
CC: Recurrent urinary tract infections.    HPI: It is a pleasure to see Ms. Kiah Beaver, a very pleasant 22 year old female seen today in the urology clinic in consultation from CHARANJIT Dwyer CNP ([facility]) for evaluation of recurrent urinary tract infections. These have been ongoing since January 2017 per patient (she had only 1 UTI prior to that). The patient reports having 5-6 UTI's in the past year. She is not immunosuppressed. Infections are typically symptomatic.  Typical symptoms include: dysuria, frequency, urgency, occasional gross hematuria. She denies abdominal pain, flank pain, fevers, chills. She has no history of stones.  She has never been hospitalized for UTIs.   With infection, Ms. Kiah Beaver typically goes to [facility] or her PCP through [facility] where cultures are performed (see below).  Is usually given antibiotics and reports that symptoms do resolve appropriately with therapy. She occasionally experiences lingering dysuria that takes longer to resolve. She is asymptomatic today. Was recently started on daily suppressive therapy with nitrofurantion 50 mg qhs by her PCP.    Of note, she did see urology through [facility] 9/2017. They prescribed post-coital Bactrim which did not work per patient. Note recommended possible urethral dilation if she continued to have frequent UTI's although physical exam did not comment on any urethral stenosis or other abnormalities.    -Age of first UTI: 21  -Frequency of infections: q1-2 months  -Inciting events/triggers include: sexual activity.    -Daily Fluid intake: moderate amount of water, some caffeine  -Wipes front to back: yes  -Cranberry supplements: no  -History of antibiotic prophylaxis in the past: yes - tried post-coital Bactrim which did not work; recently started on daily nitrofurantion 50 mg by PCP  -Voiding before and after intercourse: yes  -Bowels: occasional issues with constipation    Review of  Diagnostics:  17 - UA: trace protein, trace blood, 3-5 RBC, 3-5 WBC, moderate epith cells --> No culture  17 - UC: 50-100k E. Coli (resistant to Bactrim, o/w pan-sensitive)  17 - wet prep - positive for clue cells (treated with metronidazole)  10/16/17 - UC: 50-100k E. Coli (resistant to Bactrim, o/w pan-sensitive)  17 - UA: small LE, few bacteria --> UC: 50-100k E. Coli (resistant to Bactrim, o/w pan-sensitive)  17 - Wet prep negative; GC/CT negative  17 - UA: small blood, trace protein --> No culture  8/15/17 - UA: large blood, large LE, >100 WBC, 10-24 RBC, moderate bacteria --> UC: 10-50k mixed  josé  16 - UA: large blood, moderate LE, 50-99 WBC, 3-4 RBC --> UC: 50-100k mixed  josé    OBSTETRIC HISTORY:  The patient is . Periods are regular.      Past Medical History:   Diagnosis Date     Anxiety      Depressive disorder      Hearing loss     bilateral     UTI (urinary tract infection)      Past Surgical History:   Procedure Laterality Date     ENT SURGERY      wisdom teeth extraction     Current Outpatient Prescriptions   Medication Sig Dispense Refill     nitroFURantoin, macrocrystal-monohydrate, (MACROBID) 100 MG capsule Take 50 mg by mouth       etonogestrel-ethinyl estradiol (NUVARING) 0.12-0.015 MG/24HR vaginal ring Place 1 each vaginally every 28 days       sulfamethoxazole-trimethoprim (BACTRIM/SEPTRA) 400-80 MG per tablet Take 1 tablet by mouth 2 times daily       HYDROXYZINE HCL PO Take by mouth 4 times daily as needed for itching       AMITRIPTYLINE HCL PO        Venlafaxine HCl (EFFEXOR PO) Take 225 mg by mouth daily        ALPRAZolam (XANAX PO) Take 0.25 mg by mouth every 4 hours as needed for anxiety       Allergies   Allergen Reactions     Flu Virus Vaccine      Ciprofloxacin Nausea and Rash     Influenza Vaccines Rash     2015 vaccine rash on arm  2015 vaccine rash on arm     Lamictal [Lamotrigine] Rash and Unknown       Family History: There is no  h/o  carcinoma.  There is no h/o urolithiasis.    Social History: The patient does not smoke cigarettes, minimal EtOH and no illicit drug use.    ROS:   Answers for HPI/ROS submitted by the patient on 11/20/2017   General Symptoms: Yes  Skin Symptoms: No  HENT Symptoms: No  EYE SYMPTOMS: No  HEART SYMPTOMS: No  LUNG SYMPTOMS: No  INTESTINAL SYMPTOMS: Yes  URINARY SYMPTOMS: Yes  GYNECOLOGIC SYMPTOMS: Yes  BREAST SYMPTOMS: No  SKELETAL SYMPTOMS: No  BLOOD SYMPTOMS: Yes  NERVOUS SYSTEM SYMPTOMS: Yes  MENTAL HEALTH SYMPTOMS: Yes  Fever: No  Loss of appetite: No  Weight loss: No  Weight gain: No  Fatigue: Yes  Night sweats: No  Chills: No  Increased stress: Yes  Excessive hunger: No  Excessive thirst: Yes  Feeling hot or cold when others believe the temperature is normal: No  Loss of height: No  Post-operative complications: No  Surgical site pain: No  Hallucinations: No  Change in or Loss of Energy: No  Hyperactivity: No  Confusion: No  Heart burn or indigestion: No  Nausea: Yes  Vomiting: Yes  Abdominal pain: Yes  Bloating: Yes  Constipation: Yes  Diarrhea: No  Blood in stool: Yes  Black stools: No  Rectal or Anal pain: No  Fecal incontinence: No  Yellowing of skin or eyes: No  Vomit with blood: No  Change in stools: Yes  Trouble holding urine or incontinence: Yes  Pain or burning: Yes  Trouble starting or stopping: Yes  Increased frequency of urination: Yes  Blood in urine: No  Decreased frequency of urination: No  Frequent nighttime urination: Yes  Flank pain: No  Difficulty emptying bladder: Yes  Anemia: No  Swollen glands: Yes  Easy bleeding or bruising: No  Edema or swelling: No  Trouble with coordination: No  Dizziness or trouble with balance: No  Fainting or black-out spells: No  Memory loss: No  Headache: Yes  Seizures: No  Speech problems: No  Tingling: No  Tremor: No  Weakness: No  Difficulty walking: No  Paralysis: No  Numbness: No  Bleeding or spotting between periods: No  Heavy or painful periods:  "No  Irregular periods: No  Vaginal discharge: Yes  Hot flashes: No  Vaginal dryness: No  Genital ulcers: No  Reduced libido: Yes  Painful intercourse: Yes  Difficulty with sexual arousal: Yes  Post-menopausal bleeding: No  Nervous or Anxious: Yes  Depression: Yes  Trouble sleeping: No  Trouble thinking or concentrating: No  Mood changes: No  Panic attacks: Yes      PHYSICAL EXAM:   Blood pressure 126/75, pulse 93, height 1.626 m (5' 4\"), weight 54.4 kg (120 lb), not currently breastfeeding.   Body mass index is 20.6 kg/(m^2).  GENERAL: Well groomed/well developed/well nourished female in NAD.  HEENT: EOMI, AT, NC.  SKIN: Warm to touch, dry.  No visible rashes or lesions.  RESP: No increased respiratory effort.  LYMPH: No LE edema.  MS: Full ROM in extremities.  PELVIC: Deferred for now.   NEURO: Alert and oriented x 3.  PSYCH: Normal mood and affect, pleasant and agreeable during interview and exam.    Admission on 09/29/2017, Discharged on 09/29/2017   Component Date Value Ref Range Status     Color Urine 09/29/2017 Light Yellow   Final     Appearance Urine 09/29/2017 Slightly Cloudy   Final     Glucose Urine 09/29/2017 Negative  NEG^Negative mg/dL Final     Bilirubin Urine 09/29/2017 Negative  NEG^Negative Final     Ketones Urine 09/29/2017 Negative  NEG^Negative mg/dL Final     Specific Gravity Urine 09/29/2017 1.008  1.003 - 1.035 Final     Blood Urine 09/29/2017 Negative  NEG^Negative Final     pH Urine 09/29/2017 7.0  5.0 - 7.0 pH Final     Protein Albumin Urine 09/29/2017 Negative  NEG^Negative mg/dL Final     Urobilinogen mg/dL 09/29/2017 Normal  0.0 - 2.0 mg/dL Final     Nitrite Urine 09/29/2017 Negative  NEG^Negative Final     Leukocyte Esterase Urine 09/29/2017 Small* NEG^Negative Final     Source 09/29/2017 Catheterized Urine   Final     WBC Urine 09/29/2017 <1  0 - 2 /HPF Final     RBC Urine 09/29/2017 <1  0 - 2 /HPF Final     Bacteria Urine 09/29/2017 Few* NEG^Negative /HPF Final     Squamous " Epithelial /HPF Urine 09/29/2017 2* 0 - 1 /HPF Final     Mucous Urine 09/29/2017 Present* NEG^Negative /LPF Final     Amorphous Crystals 09/29/2017 Few* NEG^Negative /HPF Final     Specimen Description 09/29/2017 Midstream Urine   Final     Special Requests 09/29/2017 Specimen received in preservative   Final     Culture Micro 09/29/2017 *  Final                    Value:50,000 to 100,000 colonies/mL  Escherichia coli       HCG Qual Urine 09/29/2017 Negative  NEG^Negative Final    Comment: This test is for screening purposes.  Results should be interpreted along with   the clinical picture.  Confirmation testing is available if warranted by   ordering [id number], HCG Quantitative Pregnancy.       Specimen Description 09/29/2017 Genital VAGINAL   Final     Wet Prep 09/29/2017 No Trichomonas seen   Final     Wet Prep 09/29/2017 No yeast seen   Final     Wet Prep 09/29/2017 No clue cells seen   Final     Wet Prep 09/29/2017    Final                    Value:PMNs seen  Few       Specimen Description 09/29/2017 Genital   Final    VAGINAL     Chlamydia Trachomatis PCR 09/29/2017 Negative  NEG^Negative Final    Comment: Negative for C. trachomatis rRNA by transcription mediated amplification.  A negative result by transcription mediated amplification does not preclude   the presence of C. trachomatis infection because results are dependent on   proper and adequate collection, absence of inhibitors, and sufficient rRNA to   be detected.       Specimen Descrip 09/29/2017 Genital   Final    VAGINAL     N Gonorrhea PCR 09/29/2017 Negative  NEG^Negative Final    Comment: Negative for N. gonorrhoeae rRNA by transcription mediated amplification.  A negative result by transcription mediated amplification does not preclude   the presence of N. gonorrhoeae infection because results are dependent on   proper and adequate collection, absence of inhibitors, and sufficient rRNA to   be detected.         IMAGING:   CT A/P w/contrast    11/17/17  IMPRESSION:  1. No acute abnormality identified.  Normal caliber appendix identified in a right lower abdominal quadrant. No acute inflammatory process identified.   2. No hydronephrosis or imaging evidence of upper tract urinary infection.   3. Moderate stool throughout nondilated large bowel.    PVR: Postvoid residual urine volume as measured by ultrasound was 0 mL.    ASSESSMENT/PLAN:  Ms. Kiah Beaver is a 22 year old female with a history of recurrent urinary tract infections, well-documented (see above). Recent CT A/P was unremarkable with no obvious reason for frequent UTI's. Tried post-coital prophylaxis with Bactrim which did not work well. Her PCP recently started her on daily suppressive therapy with nitrofurantoin 50 mg qhs and she is currently asymptomatic. Agree with plan for daily suppressive therapy for duration ~3-6 months. Hopefully this will break her UTI cycle. Plan for additional workup as follows:  -Send urine for UA/UC, ureaplasma, mycoplasma. Treat pending results.  - Lifestyle and hygiene modifications were reviewed today in clinic, including wiping front to back, wearing cotton breathable underwear, voiding before and after intercourse to flush the urethra, minimizing baths and opting for showers, and appropriate perineal hygiene. Push fluids to keep the urine dilute.   - Cranberry supplements or vitamin C are viable supplemental treatment options in attempts of acidifying the urine to make the bladder less inhabitable for bacteria.    Follow up with me in 3 months to recheck. If still getting frequent UTI's, will likely plan to move on for cystoscopy for intravesical examination.     Should the patient develop symptoms consistent with a urinary tract infection in the interim, I have asked her to call our clinic directly. Nursing staff will then place an order for UA/UC and she may then make a lab only appointment to drop a urine specimen off at any [facility] for  convenience. We reviewed the importance of attempting to always leave a urine specimen when symptomatic to trend frequency of infections, causative organisms and drug sensitivities.      I have enjoyed participating in the medical care of this very pleasant patient.  Please don't hesitate to contact me with any questions or concerns.     Jo Marino PA-C  Department of Urology

## 2017-11-26 LAB
BACTERIA SPEC CULT: ABNORMAL
SPECIMEN SOURCE: ABNORMAL

## 2017-11-27 ENCOUNTER — CARE COORDINATION (OUTPATIENT)
Dept: UROLOGY | Facility: CLINIC | Age: 22
End: 2017-11-27

## 2017-11-27 DIAGNOSIS — N39.0 URINARY TRACT INFECTION: Primary | ICD-10-CM

## 2017-11-27 LAB
BACTERIA SPEC CULT: NORMAL
SPECIMEN SOURCE: NORMAL

## 2017-11-27 RX ORDER — DOXYCYCLINE 100 MG/1
100 CAPSULE ORAL 2 TIMES DAILY
Qty: 14 CAPSULE | Refills: 0 | Status: SHIPPED | OUTPATIENT
Start: 2017-11-27 | End: 2017-12-04

## 2017-11-27 NOTE — PROGRESS NOTES
Ureaplasma culture   Status:  Final result   Visible to patient:  No (Not Released) Dx:  Recurrent UTI Order: [id number]       Notes Recorded by Jo Marino PA-C on 11/27/2017 at 7:22 AM  Please contact patient with + ureaplasma results. Can treat this with Doxycycline 100 mg BID x 7 days. Please confirm preferred pharmacy and send. Please let her know that any current partners should also be tested and treated if + and she should abstain from sexual activity until both partners have completed antibiotics if applicable. Also, she should STOP the daily prophylaxis with nitrofurantoin while taking Doxy and may then resume prophylaxis once she completes Doxy. Thanks! Jo Marino PA-C    ------    Notes Recorded by Jo Marino PA-C on 11/20/2017 at 11:49 PM  Noted. Discussed UA results with patient in clinic. Ureaplasma/mycoplasma cultures in process. Will need to recheck urine at follow up appt. If persistent microhematuria, may require further evaluation with cystoscopy.        Newer results are available. Click to view them now.                7d ago       Specimen Description Unspecified Urine     Culture Micro Ureaplasma species isolated (A)     Resulting Agency [facility]            Notified patient of recommendations from physician.  Patient verbalized understanding. No further questions.   RX sent to patient's pharmacy.    Rianna Alfonso, JANKI-BC, BSN  Care Coordinator - Reconstructive Urology

## 2018-02-05 ENCOUNTER — PRE VISIT (OUTPATIENT)
Dept: UROLOGY | Facility: CLINIC | Age: 23
End: 2018-02-05

## 2018-02-05 NOTE — TELEPHONE ENCOUNTER
Patient is coming in to see Jo Marino PA-C for UTI's, called patient left a message to please come to this appointment with a full bladder for a urine test.

## 2018-02-26 ENCOUNTER — HOSPITAL ENCOUNTER (EMERGENCY)
Facility: CLINIC | Age: 23
Discharge: HOME | End: 2018-02-26
Attending: FAMILY MEDICINE | Admitting: FAMILY MEDICINE
Payer: COMMERCIAL

## 2018-02-26 VITALS
DIASTOLIC BLOOD PRESSURE: 85 MMHG | SYSTOLIC BLOOD PRESSURE: 118 MMHG | HEIGHT: 64 IN | HEART RATE: 91 BPM | TEMPERATURE: 98.1 F | WEIGHT: 115 LBS | BODY MASS INDEX: 19.63 KG/M2 | RESPIRATION RATE: 16 BRPM | OXYGEN SATURATION: 98 %

## 2018-02-26 DIAGNOSIS — R31.9 URINARY TRACT INFECTION WITH HEMATURIA, SITE UNSPECIFIED: ICD-10-CM

## 2018-02-26 DIAGNOSIS — N39.0 URINARY TRACT INFECTION WITH HEMATURIA, SITE UNSPECIFIED: ICD-10-CM

## 2018-02-26 LAB
ALBUMIN UR-MCNC: ABNORMAL MG/DL
APPEARANCE UR: ABNORMAL
BACTERIA #/AREA URNS HPF: ABNORMAL /HPF
BILIRUB UR QL STRIP: ABNORMAL
COLOR UR AUTO: ABNORMAL
GLUCOSE UR STRIP-MCNC: ABNORMAL MG/DL
HCG UR QL: NEGATIVE
HGB UR QL STRIP: ABNORMAL
HYALINE CASTS #/AREA URNS LPF: <1 /LPF (ref 0–2)
INTERNAL QC OK POCT: YES
KETONES UR STRIP-MCNC: ABNORMAL MG/DL
LEUKOCYTE ESTERASE UR QL STRIP: ABNORMAL
MUCOUS THREADS #/AREA URNS LPF: PRESENT /LPF
NITRATE UR QL: ABNORMAL
PH UR STRIP: ABNORMAL PH (ref 5–7)
RBC #/AREA URNS AUTO: 4 /HPF (ref 0–2)
SOURCE: ABNORMAL
SP GR UR STRIP: ABNORMAL (ref 1–1.03)
SQUAMOUS #/AREA URNS AUTO: ABNORMAL /HPF (ref 0–1)
TRANS CELLS #/AREA URNS HPF: <1 /HPF (ref 0–1)
UROBILINOGEN UR STRIP-MCNC: ABNORMAL MG/DL (ref 0–2)
WBC #/AREA URNS AUTO: 37 /HPF (ref 0–2)

## 2018-02-26 PROCEDURE — 99283 EMERGENCY DEPT VISIT LOW MDM: CPT | Performed by: FAMILY MEDICINE

## 2018-02-26 PROCEDURE — 99284 EMERGENCY DEPT VISIT MOD MDM: CPT | Mod: Z6 | Performed by: FAMILY MEDICINE

## 2018-02-26 PROCEDURE — 81025 URINE PREGNANCY TEST: CPT | Performed by: FAMILY MEDICINE

## 2018-02-26 PROCEDURE — 87109 MYCOPLASMA: CPT | Performed by: FAMILY MEDICINE

## 2018-02-26 PROCEDURE — 25000132 ZZH RX MED GY IP 250 OP 250 PS 637: Performed by: FAMILY MEDICINE

## 2018-02-26 PROCEDURE — 81001 URINALYSIS AUTO W/SCOPE: CPT | Performed by: EMERGENCY MEDICINE

## 2018-02-26 PROCEDURE — 87086 URINE CULTURE/COLONY COUNT: CPT | Performed by: EMERGENCY MEDICINE

## 2018-02-26 RX ORDER — NITROFURANTOIN 25; 75 MG/1; MG/1
100 CAPSULE ORAL ONCE
Status: COMPLETED | OUTPATIENT
Start: 2018-02-26 | End: 2018-02-26

## 2018-02-26 RX ORDER — NITROFURANTOIN 25; 75 MG/1; MG/1
100 CAPSULE ORAL 2 TIMES DAILY
Qty: 20 CAPSULE | Refills: 0 | Status: SHIPPED | OUTPATIENT
Start: 2018-02-26 | End: 2018-03-08

## 2018-02-26 RX ADMIN — NITROFURANTOIN (MONOHYDRATE/MACROCRYSTALS) 100 MG: 75; 25 CAPSULE ORAL at 22:21

## 2018-02-26 ASSESSMENT — ENCOUNTER SYMPTOMS
DYSURIA: 1
FEVER: 0
VOMITING: 0
NAUSEA: 0
HEMATURIA: 1
CHILLS: 0

## 2018-02-26 NOTE — ED AVS SNAPSHOT
[facility], [place], Emergency Department    [address] [place] [address]    Phone:  [phone]                                       Kiah Beaver   MRN: [id number]    Department:  [facility], Emergency Department   Date of Visit:  2/26/2018           After Visit Summary Signature Page     I have received my discharge instructions, and my questions have been answered. I have discussed any challenges I see with this plan with the nurse or doctor.    ..........................................................................................................................................  Patient/Patient Representative Signature      ..........................................................................................................................................  Patient Representative Print Name and Relationship to Patient    ..................................................               ................................................  Date                                            Time    ..........................................................................................................................................  Reviewed by Signature/Title    ...................................................              ..............................................  Date                                                            Time

## 2018-02-26 NOTE — ED AVS SNAPSHOT
[facility], Emergency Department    [address] [address]    Phone:  [phone]                                       Kiah Beaver   MRN: [id number]    Department:  [facility], Emergency Department   Date of Visit:  2/26/2018           Patient Information     Date Of Birth          1995        Your diagnoses for this visit were:     Urinary tract infection with hematuria, site unspecified        You were seen by Carlos Alvarez MD.      Follow-up Information     Follow up with Clinics, [facility].        Discharge Instructions       Home.  Take the macrobid twice a day for 10 days.  Push fluids.  Return if fever, back pain or other concerns.  Call [facility] after treatment to ask about continuing the once a day macrobid and also check on the ureaplasma urine culture results also.    Please make an appointment to follow up with Urology Clinic (phone: [phone]) in 10 days.      Understanding Urinary Tract Infections (UTIs)  Most UTIs are caused by bacteria, although they may also be caused by viruses or fungi. Bacteria from the bowel are the most common source of infection. The infection may start because of any of the following:    Sexual activity. During sex, bacteria can travel from the penis, vagina, or rectum into the urethra.     Bacteria on the skin outside the rectum may travel into the urethra. This is more common in women since the rectum and urethra are closer to each other than in men. Wiping from front to back after using the toilet and keeping the area clean can help prevent germs from getting to the urethra.    Blockage of urine flow through the urinary tract. If urine sits too long, germs may start to grow out of control.      Parts of the urinary tract  The infection can occur in any part of the urinary tract.    The kidneys collect and store urine.    The ureters carry urine from the kidneys to the bladder.    The bladder holds urine until you are  ready to let it out.    The urethra carries urine from the bladder out of the body. It is shorter in women, so bacteria can move through it more easily. The urethra is longer in men, so a UTI is less likely to reach the bladder or kidneys in men.  Date Last Reviewed: 1/1/2017 2000-2017 [employer]. [address] [place] [address]. All rights reserved. This information is not intended as a substitute for professional medical care. Always follow your healthcare professional's instructions.          24 Hour Appointment Hotline       To make an appointment at any [facility], call [phone] ([phone]). If you don't have a family doctor or clinic, we will help you find one. [place] clinics are conveniently located to serve the needs of you and your family.             Review of your medicines      START taking        Dose / Directions Last dose taken    nitroFURantoin (macrocrystal-monohydrate) 100 MG capsule   Commonly known as:  MACROBID   Dose:  100 mg   Quantity:  20 capsule        Take 1 capsule (100 mg) by mouth 2 times daily for 10 days   Refills:  0          Our records show that you are taking the medicines listed below. If these are incorrect, please call your family doctor or clinic.        Dose / Directions Last dose taken    EFFEXOR PO   Dose:  225 mg        Take 225 mg by mouth daily   Refills:  0        etonogestrel-ethinyl estradiol 0.12-0.015 MG/24HR vaginal ring   Commonly known as:  NUVARING   Dose:  1 each        Place 1 each vaginally every 28 days   Refills:  0        HYDROXYZINE HCL PO   Indication:  Anxiety Neurosis        Take by mouth 4 times daily as needed for itching   Refills:  0        XANAX PO   Dose:  0.25 mg        Take 0.25 mg by mouth every 4 hours as needed for anxiety   Refills:  0                Prescriptions were sent or printed at these locations (1 Prescription)                   Other Prescriptions                Printed at  "Department/Unit printer (1 of 1)         nitroFURantoin, macrocrystal-monohydrate, (MACROBID) 100 MG capsule                Procedures and tests performed during your visit     Routine UA with microscopic    Urine Culture Aerobic Bacterial    hCG qual urine POCT      Orders Needing Specimen Collection     None      Pending Results     Date and Time Order Name Status Description    2/26/2018 2022 Urine Culture Aerobic Bacterial Preliminary             Pending Culture Results     Date and Time Order Name Status Description    2/26/2018 2022 Urine Culture Aerobic Bacterial Preliminary             Pending Results Instructions     If you had any lab results that were not finalized at the time of your Discharge, you can call the ED Lab Result RN at [phone]. You will be contacted by this team for any positive Lab results or changes in treatment. The nurses are available 7 days a week from 10A to 6:30P.  You can leave a message 24 hours per day and they will return your call.        Thank you for choosing [facility]       Thank you for choosing [facility] for your care. Our goal is always to provide you with excellent care. Hearing back from our patients is one way we can continue to improve our services. Please take a few minutes to complete the written survey that you may receive in the mail after you visit with us. Thank you!        [facility] Information     [employer] lets you send messages to your doctor, view your test results, renew your prescriptions, schedule appointments and more. To sign up, go to [email or web address] . Click on \"Log in\" on the left side of the screen, which will take you to the Welcome page. Then click on \"Sign up Now\" on the right side of the page.     You will be asked to enter the access code listed below, as well as some personal information. Please follow the directions to create your username and password.     Your access code is: [email or web address]  Expires: 5/6/2018  6:30 AM     Your access " code will  in 90 days. If you need help or a new code, please call your [place] clinic or [phone].        Care EveryWhere ID     This is your Care EveryWhere ID. This could be used by other organizations to access your [place] medical records  [id number]        Equal Access to Services     NATALIE PAUL : Eber hodgeo Sokamar, waaxda luqadaha, qaybta kaalmada arturo, luis molian. So [facility] [phone].    ATENCIÓN: Si habla español, tiene a rod disposición servicios gratuitos de asistencia lingüística. Llame al [phone].    We comply with applicable federal civil rights laws and [place] laws. We do not discriminate on the basis of race, color, national origin, age, disability, sex, sexual orientation, or gender identity.            After Visit Summary       This is your record. Keep this with you and show to your community pharmacist(s) and doctor(s) at your next visit.

## 2018-02-27 LAB
BACTERIA SPEC CULT: NORMAL
BACTERIA SPEC CULT: NORMAL
Lab: NORMAL
SPECIMEN SOURCE: NORMAL

## 2018-02-27 ASSESSMENT — ENCOUNTER SYMPTOMS
HEADACHES: 0
FLANK PAIN: 0
BRUISES/BLEEDS EASILY: 0
NECK STIFFNESS: 0
ARTHRALGIAS: 0
WEAKNESS: 0
EYE REDNESS: 0
BACK PAIN: 0
ABDOMINAL PAIN: 0
DECREASED CONCENTRATION: 0
APPETITE CHANGE: 0
COLOR CHANGE: 0
ACTIVITY CHANGE: 0
SHORTNESS OF BREATH: 0
DYSPHORIC MOOD: 0
DIFFICULTY URINATING: 0
CONFUSION: 0

## 2018-02-27 NOTE — ED NOTES
Pt presents to ED with concern for UTI. Pt states it burns when she urinates and she noticed a little blood in her urine pt states she suffers from recurrent UTIs. Pt also admits she took over the counter AZO for her UTI symptoms

## 2018-02-27 NOTE — ED PROVIDER NOTES
[place] EMERGENCY DEPARTMENT ([facility])  2/26/18 ED 4 8:30 PM   History     Chief Complaint   Patient presents with     Dysuria     The history is provided by the patient and medical records.     Kiah Beaver is a 22 year old female who presents with painful urination and mild hematuria.  Patient states this is reminiscent of her recurrent UTIs.  She notes that she typically gets UTIs once a month.  She has seen 2 different urologists for this (one at [facility], the other through [facility]) and had testing including Ureaplasma and mycoplasma.  Her urea plasma culture did come back positive, she was treated with doxycycline.  She states that she typically improves with nitrofurantoin.  She remained free of UTI symptoms until recently when she developed dysuria, hematuria, and tenderness over her bladder.  She has tried azo/Pyridium, did take some today with mild improvement in her discomfort. She has been treated with Cipro in the past but had a bad reaction to this.  She has had Bactrim in past as well though this does not seem as effective.  Patient notes her last menstrual period was a little strange in timing, as it started on the 10th instead of the 6th. Patient does have a follow up appointment soon With Urology. No fevers, nausea.  She went to the patient was seen by urology recommendation that point to be on once a day Macrobid but she never did fill this.  Patient also notes that she was treated for Ureaplasma and also her partner was tested and was negative.      Patient is a [employer] student, typically has cares through [facility].    I have reviewed the Medications, Allergies, Past Medical and Surgical History, and Social History in the [employer] system.  Past Medical History:   Diagnosis Date     Anxiety      Depressive disorder      Hearing loss     bilateral     UTI (urinary tract infection)        Past Surgical History:   Procedure Laterality Date     ENT SURGERY      marino  "teeth extraction       Family History   Problem Relation Age of Onset     Bipolar Disorder Maternal Grandmother      Depression Paternal Aunt      Anxiety Disorder Paternal Aunt      Substance Abuse Paternal Aunt      Depression Paternal Aunt      Anxiety Disorder Paternal Aunt      Depression Maternal Uncle      Anxiety Disorder Maternal Uncle      Suicide Maternal Uncle      Substance Abuse Maternal Uncle        Social History   Substance Use Topics     Smoking status: Never Smoker     Smokeless tobacco: Never Used     Alcohol use No      Review of Systems   Constitutional: Negative for activity change, appetite change, chills and fever.   HENT: Negative for congestion.    Eyes: Negative for redness.   Respiratory: Negative for shortness of breath.    Cardiovascular: Negative for chest pain.   Gastrointestinal: Negative for abdominal pain, nausea and vomiting.   Genitourinary: Positive for dysuria and hematuria. Negative for difficulty urinating, flank pain, vaginal bleeding and vaginal discharge.   Musculoskeletal: Negative for arthralgias, back pain and neck stiffness.   Skin: Negative for color change.   Neurological: Negative for weakness and headaches.   Hematological: Does not bruise/bleed easily.   Psychiatric/Behavioral: Negative for confusion, decreased concentration and dysphoric mood.   All other systems reviewed and are negative.      Physical Exam   BP: 126/90  Pulse: 83  Temp: 98.1  F (36.7  C)  Resp: 18  Height: 162.6 cm (5' 4\")  Weight: 52.2 kg (115 lb)  SpO2: 99 %      Physical Exam   Constitutional: She is oriented to person, place, and time. She appears well-developed and well-nourished. She appears distressed.   Patient nontoxic minimal distress alert and oriented   HENT:   Head: Normocephalic and atraumatic.   Eyes: EOM are normal. Pupils are equal, round, and reactive to light. No scleral icterus.   Neck: Normal range of motion. Neck supple.   Cardiovascular: Regular rhythm.  "   Pulmonary/Chest: No stridor. No respiratory distress.   Abdominal: She exhibits no distension. There is tenderness.   Mild suprapubic tenderness but no retention no rebound or guarding   Musculoskeletal: She exhibits no edema, tenderness or deformity.   No CVA tenderness   Neurological: She is alert and oriented to person, place, and time. She has normal reflexes. No cranial nerve deficit. Coordination normal.   Skin: Skin is warm and dry. No rash noted. She is not diaphoretic. No erythema. No pallor.   Psychiatric: She has a normal mood and affect. Her behavior is normal. Judgment and thought content normal.   Nursing note and vitals reviewed.      ED Course     ED Course     Procedures        Patient value in the ER.  Urinalysis this patient did take Pyridium did show signs of infection.  She is not pregnant.  Patient given Macrobid 100 mg orally in the ER.  Prescription written for 10 days was also written with patient.  We did send off a urine culture also for Ureaplasma.    Patient follow-up with urology as she is doing well return if worsening symptoms she will check on urine cultures and also Ureaplasma study and once a day Macrobid for the next 3-6 months also recommendations regarding per urology.    Critical Care time:  none             Labs Ordered and Resulted from Time of ED Arrival Up to the Time of Departure from the ED   ROUTINE UA WITH MICROSCOPIC - Abnormal; Notable for the following:        Result Value    Glucose Urine Interfering substances, unable to perform test (*)     Bilirubin Urine Interfering substances, unable to perform test (*)     Ketones Urine Interfering substances, unable to perform test (*)     Blood Urine Interfering substances, unable to perform test (*)     Protein Albumin Urine Interfering substances, unable to perform test (*)     Nitrite Urine Interfering substances, unable to perform test (*)     Leukocyte Esterase Urine Interfering substances, unable to perform test (*)      Bacteria Urine Few (*)     Mucous Urine Present (*)     All other components within normal limits   HCG QUAL URINE POCT - Normal   URINE CULTURE AEROBIC BACTERIAL     Results for orders placed or performed during the hospital encounter of 02/26/18   Routine UA with microscopic   Result Value Ref Range    Color Urine Orange     Appearance Urine Slightly Cloudy     Glucose Urine Interfering substances, unable to perform test (A) NEG^Negative mg/dL    Bilirubin Urine Interfering substances, unable to perform test (A) NEG^Negative    Ketones Urine Interfering substances, unable to perform test (A) NEG^Negative mg/dL    Specific Gravity Urine Interfering substances, unable to perform test 1.003 - 1.035    Blood Urine Interfering substances, unable to perform test (A) NEG^Negative    pH Urine Interfering substances, unable to perform test 5.0 - 7.0 pH    Protein Albumin Urine Interfering substances, unable to perform test (A) NEG^Negative mg/dL    Urobilinogen mg/dL Interfering substances, unable to perform test 0.0 - 2.0 mg/dL    Nitrite Urine Interfering substances, unable to perform test (A) NEG^Negative    Leukocyte Esterase Urine Interfering substances, unable to perform test (A) NEG^Negative    Source Clean catch urine     WBC Urine 37 0 - 2 /HPF    RBC Urine 4 0 - 2 /HPF    Bacteria Urine Few (A) NEG^Negative /HPF    Squamous Epithelial /HPF Urine Moderate 0 - 1 /HPF    Transitional Epi <1 0 - 1 /HPF    Mucous Urine Present (A) NEG^Negative /LPF    Hyaline Casts <1 0 - 2 /LPF   hCG qual urine POCT   Result Value Ref Range    HCG Qual Urine Negative neg    Internal QC OK Yes    Urine Culture Aerobic Bacterial   Result Value Ref Range    Specimen Description Midstream Urine     Special Requests Specimen received in preservative     Culture Micro PENDING               Assessments & Plan (with Medical Decision Making)  22-year-old female with recurrent UTIs.  Patient has had a UTI since she was treated in December.   Patient was supposed to have once a day Macrobid but did not get this filled prescribed by urology.  She was treated with doxycycline for Ureaplasma also and her partner was evaluated was negative.  Patient has noted recurrent symptoms now has been taking Pyridium with some relief.  Urinalysis concerning for infection is not pregnant.  Patient started back on Macrobid will follow up with urology check and urine cultures and further regulations for once a day suppressive dose.           I have reviewed the nursing notes.    I have reviewed the findings, diagnosis, plan and need for follow up with the patient.    Discharge Medication List as of 2/26/2018 10:19 PM      START taking these medications    Details   nitroFURantoin, macrocrystal-monohydrate, (MACROBID) 100 MG capsule Take 1 capsule (100 mg) by mouth 2 times daily for 10 days, Disp-20 capsule, R-0, Local Print             Final diagnoses:   Urinary tract infection with hematuria, site unspecified   IJaimee, am serving as a trained medical scribe to document services personally performed by Carlos Alvarez MD based on the provider's statements to me on February 26, 2018.  This document has been checked and approved by the attending provider.    I, Carlos Alvarez MD, was physically present and have reviewed and verified the accuracy of this note documented by Jaimee Campbell, medical scribe.     2/26/2018   [facility], [place], EMERGENCY DEPARTMENT    This note was created at least in part by the use of dragon voice dictation system. Inadvertent typographical errors may still exist.  Carlos Alvarez MD.         Carlos Alvarez MD  02/27/18 0006

## 2018-02-27 NOTE — DISCHARGE INSTRUCTIONS
Home.  Take the macrobid twice a day for 10 days.  Push fluids.  Return if fever, back pain or other concerns.  Call [facility] after treatment to ask about continuing the once a day macrobid and also check on the ureaplasma urine culture results also.    Please make an appointment to follow up with Urology Clinic (phone: [phone]) in 10 days.      Understanding Urinary Tract Infections (UTIs)  Most UTIs are caused by bacteria, although they may also be caused by viruses or fungi. Bacteria from the bowel are the most common source of infection. The infection may start because of any of the following:    Sexual activity. During sex, bacteria can travel from the penis, vagina, or rectum into the urethra.     Bacteria on the skin outside the rectum may travel into the urethra. This is more common in women since the rectum and urethra are closer to each other than in men. Wiping from front to back after using the toilet and keeping the area clean can help prevent germs from getting to the urethra.    Blockage of urine flow through the urinary tract. If urine sits too long, germs may start to grow out of control.      Parts of the urinary tract  The infection can occur in any part of the urinary tract.    The kidneys collect and store urine.    The ureters carry urine from the kidneys to the bladder.    The bladder holds urine until you are ready to let it out.    The urethra carries urine from the bladder out of the body. It is shorter in women, so bacteria can move through it more easily. The urethra is longer in men, so a UTI is less likely to reach the bladder or kidneys in men.  Date Last Reviewed: 1/1/2017 2000-2017 [employer]. [address] [place] [address], [place] [address]. All rights reserved. This information is not intended as a substitute for professional medical care. Always follow your healthcare professional's instructions.

## 2018-03-02 ENCOUNTER — OFFICE VISIT (OUTPATIENT)
Dept: UROLOGY | Facility: CLINIC | Age: 23
End: 2018-03-02
Payer: COMMERCIAL

## 2018-03-02 VITALS — HEART RATE: 82 BPM | OXYGEN SATURATION: 100 % | SYSTOLIC BLOOD PRESSURE: 114 MMHG | DIASTOLIC BLOOD PRESSURE: 80 MMHG

## 2018-03-02 DIAGNOSIS — A49.3 NGU DUE TO UREAPLASMA UREALYTICUM: ICD-10-CM

## 2018-03-02 DIAGNOSIS — N34.1 NGU DUE TO UREAPLASMA UREALYTICUM: ICD-10-CM

## 2018-03-02 DIAGNOSIS — N39.0 RECURRENT UTI: Primary | ICD-10-CM

## 2018-03-02 LAB
ALBUMIN UR-MCNC: 10 MG/DL
AMORPH CRY #/AREA URNS HPF: ABNORMAL /HPF
APPEARANCE UR: ABNORMAL
BACTERIA #/AREA URNS HPF: ABNORMAL /HPF
BACTERIA SPEC CULT: ABNORMAL
BILIRUB UR QL STRIP: NEGATIVE
COLOR UR AUTO: ABNORMAL
GLUCOSE UR STRIP-MCNC: NEGATIVE MG/DL
HGB UR QL STRIP: NEGATIVE
KETONES UR STRIP-MCNC: NEGATIVE MG/DL
LEUKOCYTE ESTERASE UR QL STRIP: NEGATIVE
NITRATE UR QL: NEGATIVE
NON-SQ EPI CELLS #/AREA URNS LPF: ABNORMAL /LPF
PH UR STRIP: 8 PH (ref 5–7)
RBC #/AREA URNS AUTO: ABNORMAL /HPF
SOURCE: ABNORMAL
SP GR UR STRIP: 1.02 (ref 1–1.03)
SPECIMEN SOURCE: ABNORMAL
UROBILINOGEN UR STRIP-MCNC: NORMAL MG/DL (ref 0–2)
WBC #/AREA URNS AUTO: ABNORMAL /HPF

## 2018-03-02 PROCEDURE — 81001 URINALYSIS AUTO W/SCOPE: CPT | Performed by: PHYSICIAN ASSISTANT

## 2018-03-02 PROCEDURE — 99213 OFFICE O/P EST LOW 20 MIN: CPT | Performed by: PHYSICIAN ASSISTANT

## 2018-03-02 PROCEDURE — 88112 CYTOPATH CELL ENHANCE TECH: CPT | Performed by: PHYSICIAN ASSISTANT

## 2018-03-02 RX ORDER — AZITHROMYCIN 250 MG/1
TABLET, FILM COATED ORAL
Qty: 6 TABLET | Refills: 0 | Status: SHIPPED | OUTPATIENT
Start: 2018-03-02 | End: 2018-06-04

## 2018-03-02 RX ORDER — METHENAMINE HIPPURATE 1000 MG/1
1 TABLET ORAL DAILY
Qty: 90 TABLET | Refills: 1 | Status: SHIPPED | OUTPATIENT
Start: 2018-03-02 | End: 2018-11-26 | Stop reason: ALTCHOICE

## 2018-03-02 RX ORDER — ASCORBIC ACID 500 MG
1000 TABLET ORAL DAILY
Qty: 180 TABLET | Refills: 1 | Status: SHIPPED | OUTPATIENT
Start: 2018-03-02 | End: 2018-11-26 | Stop reason: ALTCHOICE

## 2018-03-02 ASSESSMENT — PAIN SCALES - GENERAL: PAINLEVEL: NO PAIN (0)

## 2018-03-02 NOTE — PATIENT INSTRUCTIONS
UROLOGY CLINIC VISIT PATIENT INSTRUCTIONS    1) Finish your current course of Macrobid antibiotics.    2) After completing Macrobid, start taking Azithromycin to treat the ureaplasma infection. Take 2 pills on day 1, followed by one pill daily for 4 more days.    3) After completing the Azithromycin, then start taking Hiprex (methenamine) 1 gram + Vitamin C 1 gram once daily to prevent UTI's.     Follow up in 3 months to recheck, sooner if you continue to get infections. Please call [phone] to schedule appointment at the [facility].    If you have any issues, questions or concerns in the meantime, do not hesitate to contact us at [phone] or via [employer].     It was a pleasure meeting with you today.  Thank you for allowing me and my team the privilege of caring for you today.  YOU are the reason we are here, and I truly hope we provided you with the excellent service you deserve.  Please let us know if there is anything else we can do for you so that we can be sure you are leaving completely satisfied with your care experience.

## 2018-03-02 NOTE — NURSING NOTE
"Kiah Beaver's goals for this visit include:   Chief Complaint   Patient presents with     Consult     Recurrent UTI's       She requests these members of her care team be copied on today's visit information: Yes    PCP: Tamiko Lockhart    Referring Provider:  No referring provider defined for this encounter.    Chief Complaint   Patient presents with     Consult     Recurrent UTI's       Initial /80 (BP Location: Left arm, Patient Position: Sitting, Cuff Size: Adult Regular)  Pulse 82  LMP 02/10/2018  SpO2 100% Estimated body mass index is 19.74 kg/(m^2) as calculated from the following:    Height as of 2/26/18: 1.626 m (5' 4\").    Weight as of 2/26/18: 52.2 kg (115 lb).  Medication Reconciliation: complete    Do you need any medication refills at today's visit? No    "

## 2018-03-02 NOTE — PROGRESS NOTES
UROLOGY OFFICE VISIT - FOLLOW UP    REASON FOR VISIT: follow up recurrent UTI    HPI: Ms. Kiah Beaver is a pleasant 22 year old female who returns to the urology clinic for follow up of recurrent UTI's. Seen in initial consultation on 11/20/17 - please see note from this date for full history. In brief, she started having frequent infections in January 2017 and had 5-6 UTI's that year. Typical symptoms include dysuria, frequency, urgency, and occasional gross hematuria. She typically goes to [facility] or [facility] with infection. Saw [facility] urology in 9/2017 who prescribed post-coital Bactrim which did not work well per patient. At last visit, she reported that her primary doctor had started her on daily low dose nitrofurantoin 50 mg daily for UTI prevention. Plan was to continue with this and follow up in 3 months to recheck. Of note, she did have a positive ureaplasma culture so was treated with Doxycycline. Her boyfriend was also treated with Doxycycline.    She follows up today and reports that she never started taking the daily nitrofurantoin. She was doing well until early this week when she developed symptoms of another UTI. She presented to the [facility] ER where urine micro was possibly suggestive of infection (37 WBC, 4 RBC) so she was started on 10 days of Macrobid. Currently on day 4/10. Her symptoms are improving. The ureaplasma culture was also repeated and returned positive. She currently denies any gross hematuria, flank pain, fevers, chills, N/V.     PEx  /80 (BP Location: Left arm, Patient Position: Sitting, Cuff Size: Adult Regular)  Pulse 82  LMP 02/10/2018  SpO2 100%  GEN: well-appearing female in NAD  RESP: no increased respiratory effort    UA today shows pH 8.0, 10 protein, 0-2 WBC, 0-2 RBC, few bacteria, moderate amorphous crystals, o/w wnl    Review of Prior Diagnostics:  2/26/18 - UA: 37 WBC, 4 RBC, few bacteria --> UC: >100k mixed  josé  11/20/17 -  ureaplasma culture positive  11/17/17 - UA: trace protein, trace blood, 3-5 RBC, 3-5 WBC, moderate epith cells --> No culture  11/1/17 - UC: 50-100k E. Coli (resistant to Bactrim, o/w pan-sensitive)  11/1/17 - wet prep - positive for clue cells (treated with metronidazole)  10/16/17 - UC: 50-100k E. Coli (resistant to Bactrim, o/w pan-sensitive)  9/27/17 - UA: small LE, few bacteria --> UC: 50-100k E. Coli (resistant to Bactrim, o/w pan-sensitive)  9/27/17 - Wet prep negative; GC/CT negative  9/13/17 - UA: small blood, trace protein --> No culture  8/15/17 - UA: large blood, large LE, >100 WBC, 10-24 RBC, moderate bacteria --> UC: 10-50k mixed  josé  4/25/16 - UA: large blood, moderate LE, 50-99 WBC, 3-4 RBC --> UC: 50-100k mixed  josé    CT A/P w/IV contrast   11/17/17  IMPRESSION:  1. No acute abnormality identified.  Normal caliber appendix identified in a right lower abdominal quadrant. No acute inflammatory process identified.   2. No hydronephrosis or imaging evidence of upper tract urinary infection.   3. Moderate stool throughout nondilated large bowel.    A/P  22 year old female with recurrent UTI and positive ureaplasma culture. Failed post-coital prophylaxis with Bactrim. Was prescribed but never started daily low-dose nitrofurantoin. We discussed options of daily low dose antibiotic versus self-start antibiotics versus Hiprex + vitamin C. She elects for the Hiprex.    Plan:  -Finish current course of Macrobid as prescribed by ER physician  -Then start azithromycin for + ureaplasma culture  -Boyfriend will be tested/treated through [facility]. Avoid sexual activity until both have completed antibiotics.  -After completing all abx, then start methenamine 1 gm + vit C 1 gm once daily for urinary acidification and UTI prevention. Side effects discussed.  -Encouraged a daily probiotic as well.     Follow up in 3 months to recheck, sooner with any infections. If she continues to get UTI's despite the above,  may need to consider cystoscopy and a low dose daily antibiotic as suppressive therapy.     20 minutes spent with the patient, >50% in counseling and coordination of care.    Jo Marino PA-C  Department of Urology

## 2018-03-02 NOTE — MR AVS SNAPSHOT
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    2) After completing Macrobid, start taking Azithromycin to treat the ureaplasma infection. Take 2 pills on day 1, followed by one pill daily for 4 more days.    3) After completing the Azithromycin, then start taking Hiprex (methenamine) 1 gram + Vitamin C 1 gram once daily to prevent UTI's.     Follow up in 3 months to recheck, sooner if you continue to get infections. Please call [phone] to schedule appointment at the [facility].    If you have any issues, questions or concerns in the meantime, do not hesitate to contact us at [phone] or via [employer].     It was a pleasure meeting with you today.  Thank you for allowing me and my team the privilege of caring for you today.  YOU are the reason we are here, and I truly hope we provided you with the excellent service you deserve.  Please let us know if there is anything else we can do for you so that we can be sure you are leaving completely satisfied with your care experience.                Follow-ups after your visit        Who to contact     If you have questions or need follow up information about today's clinic visit or your schedule please contact [facility] directly at [phone].  Normal or non-critical lab and imaging results will be communicated to you by MyChart, letter or phone within 4 business days after the clinic has received the results. If you do not hear from us within 7 days, please contact the clinic through MyChart or phone.  If you have a critical or abnormal lab result, we will notify you by phone as soon as possible.  Submit refill requests through [employer] or call your pharmacy and they will forward the refill request to us. Please allow 3 business days for your refill to be completed.          Additional Information About Your Visit        [facility] Information     [employer] is an electronic gateway that provides easy, online access to your medical records. With [employer], you can request a clinic appointment, read your test results, renew a prescription or communicate with your care team.     To sign up for [employer] visit the website at [email or web address]   You will be asked to enter the access code listed below, as well as some personal information. Please follow the directions to create your username and password.     Your access code is: [email or web address]  Expires: 2018  6:30 AM     Your access code will  in 90 days. If you need help or a new code, please contact your [facility] or call [phone] for assistance.        Care EveryWhere ID     This is your Care EveryWhere ID. This could be used by other organizations to access your [place] medical records  [id number]        Your Vitals Were     Pulse Last Period Pulse Oximetry             82 02/10/2018 100%          Blood Pressure from Last 3 Encounters:   18 114/80   18 118/85   17 126/75    Weight from Last 3 Encounters:   18 52.2 kg (115 lb)   17 54.4 kg (120 lb)   17 56.2 kg (123 lb 14.4 oz)              We Performed the Following     Cytology non gyn     UA reflex to Microscopic and Culture     Urine Microscopic          Today's Medication Changes          These changes are accurate as of 3/2/18  5:00 PM.  If you have any questions, ask your nurse or doctor.               Start taking these medicines.        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   Quantity:  6 tablet   Refills:  0       methenamine hippurate 1 G Tabs tablet   Commonly known as:  HIPREX   Used for:  Recurrent UTI   Started by:  Jo Marino PA-C        Dose:  1 g   Take 1 tablet (1 g) by mouth daily   Quantity:  90 tablet   Refills:  1            Where to get your medicines      These medications were sent to [address] [address]     Phone:  [phone]     ascorbic acid 500 MG tablet    azithromycin 250 MG tablet    methenamine hippurate 1 G Tabs tablet                Primary Care Provider Office Phone # Fax #    Tamiko [facility] [phone]       [facility] [address] [place] [address]        Equal Access to Services     NATALIE PAUL AH: Hadii rema mendosa hadasho Soomaali, waaxda luqadaha, qaybta kaalmada adeegyada, luis molina. So [facility] [phone].    ATENCIÓN: Si habla español, tiene a rod disposición servicios gratuitos de asistencia lingüística. Llame al [phone].    We comply with applicable federal civil rights laws and [place] laws. We do not discriminate on the basis of race, color, national origin, age, disability, sex, sexual orientation, or gender identity.            Thank you!     Thank you for choosing [facility]  for your care. Our goal is always to provide you with excellent care. Hearing back from our patients is one way we can continue to improve our services. Please take a few minutes to complete the written survey that you  may receive in the mail after your visit with us. Thank you!             Your Updated Medication List - Protect others around you: Learn how to safely use, store and throw away your medicines at www.disposemymeds.org.          This list is accurate as of 3/2/18  5:00 PM.  Always use your most recent med list.                   Brand Name Dispense Instructions for use Diagnosis    ascorbic acid 500 MG tablet    VITAMIN C    180 tablet    Take 2 tablets (1,000 mg) by mouth daily    Recurrent UTI       azithromycin 250 MG tablet    ZITHROMAX    6 tablet    Two tablets first day, then one tablet daily for four days.    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       EFFEXOR PO      Take 225 mg by mouth daily        etonogestrel-ethinyl estradiol 0.12-0.015 MG/24HR vaginal ring    NUVARING     Place 1 each vaginally every 28 days        HYDROXYZINE HCL PO      Take by mouth 4 times daily as needed for itching        methenamine hippurate 1 G Tabs tablet    HIPREX    90 tablet    Take 1 tablet (1 g) by mouth daily    Recurrent UTI       nitroFURantoin (macrocrystal-monohydrate) 100 MG capsule    MACROBID    20 capsule    Take 1 capsule (100 mg) by mouth 2 times daily for 10 days        XANAX PO      Take 0.25 mg by mouth every 4 hours as needed for anxiety

## 2018-03-05 LAB — COPATH REPORT: NORMAL

## 2018-03-07 ENCOUNTER — TELEPHONE (OUTPATIENT)
Dept: UROLOGY | Facility: CLINIC | Age: 23
End: 2018-03-07

## 2018-03-07 NOTE — TELEPHONE ENCOUNTER
Please contact patient with results:   1. The UTI appears to have resolved based on the urinalysis. She should start azithromycin once she completes the Macrobid to treat the separate ureaplasma infection. Once she completes azithromycin, she can start Hiprex (methenamine) + vitamin C as we discussed in clinic.   2. The urine cytology test was negative for any cancer cells.   Thanks!   Jo Marino PA-C       Received the above result note from Jo Marino PA-C. Attempted to reach patient, but no answer. Left generic message with request for patient to return call back to clinic. When patient returns call, will discuss 3/2/18 UA/UC and cytology results and the above result note.    Indiana Bee RN, BSN

## 2018-03-09 NOTE — TELEPHONE ENCOUNTER
Nurse called patient and left message on patient's VM to return call to discuss message below.    Jagruti Georges RN, BSN  [facility]  GI/Gen Surg/Hepatology Care Coordinator

## 2018-03-09 NOTE — TELEPHONE ENCOUNTER
Pt returning call. Relayed Jo Marino PA-C message listed below. Pt stated understanding and did not have any further questions.    Miranda Elizalde LPN

## 2018-03-30 ENCOUNTER — TELEPHONE (OUTPATIENT)
Dept: UROLOGY | Facility: CLINIC | Age: 23
End: 2018-03-30

## 2018-03-30 NOTE — TELEPHONE ENCOUNTER
Prior Authorization Retail Medication Request    Medication/Dose: methenamine hippurate (HIPREX) 1 G TABS tablet  ICD code (if different than what is on RX):    Previously Tried and Failed:    Rationale:      Insurance Name:    Insurance ID:        Pharmacy Information (if different than what is on RX)  Name:  [facility]  Phone:  [phone]    Indiana Bee RN, BSN

## 2018-03-30 NOTE — TELEPHONE ENCOUNTER
Per message from the University team, the patient reports that her insurance is not covering the hiprex medication. Received message from Jo Marino PA-C with request to start a PA and contact the patient to update her. Attempted to reach patient by phone, but no answer. Left generic message with request for patient to return call to clinic. When patient returns call, will inform her that a prior authorization request for the hiprex medication has been sent to the PA team.     Indiana Bee RN, BSN

## 2018-03-30 NOTE — TELEPHONE ENCOUNTER
Received voicemail from patient requesting a return call. Returned call and spoke to patient who is aware that a request was sent to the prior authorization team and that she will be contacted once additional information is known. Patient verbalized understanding and was comfortable with plan.    nIdiana Bee RN, BSN

## 2018-04-03 NOTE — TELEPHONE ENCOUNTER
Central Prior Authorization Team   Phone: [phone]      PA Initiation - Manual Fax to Office of Student Health Benefits @ [facility] fax# [phone], Attn: Tami Good    Medication: methenamine hippurate (HIPREX) 1 G TABS tablet  Insurance Company: Other (see comments)  Pharmacy Filling the Rx: [place], [place] - [address] [place] [address]  Filling Pharmacy Phone: [phone]  Filling Pharmacy Fax: [phone]  Start Date: 4/3/2018

## 2018-04-06 NOTE — TELEPHONE ENCOUNTER
Called Tami Good, Health  with [facility] Student Health Benefits at [phone] to inquire on the Formulary Exception request. It is still under review, but hopefully will have a determination by Monday.

## 2018-04-10 NOTE — TELEPHONE ENCOUNTER
Prior Authorization Approval    Authorization Effective Date: 4/9/2018  Authorization Expiration Date: 4/9/2019  Medication: methenamine hippurate (HIPREX) 1 G TABS tablet - P/A APPROVED  Approved Dose/Quantity: 90  Reference #: Manual Fax   Insurance Company: Other (see comments)  Expected CoPay:       CoPay Card Available:      Foundation Assistance Needed:    Which Pharmacy is filling the prescription (Not needed for infusion/clinic administered): [facility] - [place], [place] - [address] [place] [address]  Pharmacy Notified:  Yes  Patient Notified:  Yes - via voicemail. Patient transferred rx to [facility], and had filled. For next fill patient could transfer back to [facility] and get the insurance benefit.

## 2018-06-04 ENCOUNTER — HOSPITAL ENCOUNTER (OUTPATIENT)
Facility: CLINIC | Age: 23
Setting detail: OBSERVATION
Discharge: HOME | End: 2018-06-05
Attending: EMERGENCY MEDICINE | Admitting: FAMILY MEDICINE
Payer: COMMERCIAL

## 2018-06-04 DIAGNOSIS — N39.0 RECURRENT UTI: ICD-10-CM

## 2018-06-04 DIAGNOSIS — N10 ACUTE PYELONEPHRITIS: ICD-10-CM

## 2018-06-04 LAB
ALBUMIN UR-MCNC: 10 MG/DL
AMORPH CRY #/AREA URNS HPF: ABNORMAL /HPF
ANION GAP SERPL CALCULATED.3IONS-SCNC: 6 MMOL/L (ref 3–14)
APPEARANCE UR: ABNORMAL
BACTERIA #/AREA URNS HPF: ABNORMAL /HPF
BASOPHILS # BLD AUTO: 0 10E9/L (ref 0–0.2)
BASOPHILS NFR BLD AUTO: 0.6 %
BILIRUB UR QL STRIP: ABNORMAL
BUN SERPL-MCNC: 9 MG/DL (ref 7–30)
CALCIUM SERPL-MCNC: 8.7 MG/DL (ref 8.5–10.1)
CHLORIDE SERPL-SCNC: 105 MMOL/L (ref 94–109)
CO2 SERPL-SCNC: 29 MMOL/L (ref 20–32)
COLOR UR AUTO: ABNORMAL
CREAT SERPL-MCNC: 0.8 MG/DL (ref 0.52–1.04)
DIFFERENTIAL METHOD BLD: NORMAL
EOSINOPHIL # BLD AUTO: 0.1 10E9/L (ref 0–0.7)
EOSINOPHIL NFR BLD AUTO: 1 %
ERYTHROCYTE [DISTWIDTH] IN BLOOD BY AUTOMATED COUNT: 12 % (ref 10–15)
GFR SERPL CREATININE-BSD FRML MDRD: 90 ML/MIN/1.7M2
GLUCOSE SERPL-MCNC: 80 MG/DL (ref 70–99)
GLUCOSE UR STRIP-MCNC: NEGATIVE MG/DL
HCG UR QL: NEGATIVE
HCT VFR BLD AUTO: 37.2 % (ref 35–47)
HGB BLD-MCNC: 12.7 G/DL (ref 11.7–15.7)
HGB UR QL STRIP: NEGATIVE
IMM GRANULOCYTES # BLD: 0 10E9/L (ref 0–0.4)
IMM GRANULOCYTES NFR BLD: 0.2 %
INTERNAL QC OK POCT: YES
KETONES UR STRIP-MCNC: NEGATIVE MG/DL
LEUKOCYTE ESTERASE UR QL STRIP: ABNORMAL
LYMPHOCYTES # BLD AUTO: 2.3 10E9/L (ref 0.8–5.3)
LYMPHOCYTES NFR BLD AUTO: 43.5 %
MCH RBC QN AUTO: 29.1 PG (ref 26.5–33)
MCHC RBC AUTO-ENTMCNC: 34.1 G/DL (ref 31.5–36.5)
MCV RBC AUTO: 85 FL (ref 78–100)
MONOCYTES # BLD AUTO: 0.3 10E9/L (ref 0–1.3)
MONOCYTES NFR BLD AUTO: 5 %
MUCOUS THREADS #/AREA URNS LPF: PRESENT /LPF
NEUTROPHILS # BLD AUTO: 2.6 10E9/L (ref 1.6–8.3)
NEUTROPHILS NFR BLD AUTO: 49.7 %
NITRATE UR QL: POSITIVE
NRBC # BLD AUTO: 0 10*3/UL
NRBC BLD AUTO-RTO: 0 /100
PH UR STRIP: 7 PH (ref 5–7)
PLATELET # BLD AUTO: 180 10E9/L (ref 150–450)
POTASSIUM SERPL-SCNC: 3.6 MMOL/L (ref 3.4–5.3)
RBC # BLD AUTO: 4.37 10E12/L (ref 3.8–5.2)
RBC #/AREA URNS AUTO: 4 /HPF (ref 0–2)
SODIUM SERPL-SCNC: 140 MMOL/L (ref 133–144)
SOURCE: ABNORMAL
SP GR UR STRIP: 1.03 (ref 1–1.03)
SQUAMOUS #/AREA URNS AUTO: 5 /HPF (ref 0–1)
UROBILINOGEN UR STRIP-MCNC: 4 MG/DL (ref 0–2)
WBC # BLD AUTO: 5.2 10E9/L (ref 4–11)
WBC #/AREA URNS AUTO: 13 /HPF (ref 0–5)

## 2018-06-04 PROCEDURE — 81025 URINE PREGNANCY TEST: CPT | Performed by: EMERGENCY MEDICINE

## 2018-06-04 PROCEDURE — 96361 HYDRATE IV INFUSION ADD-ON: CPT | Performed by: EMERGENCY MEDICINE

## 2018-06-04 PROCEDURE — 87086 URINE CULTURE/COLONY COUNT: CPT | Performed by: EMERGENCY MEDICINE

## 2018-06-04 PROCEDURE — 80048 BASIC METABOLIC PNL TOTAL CA: CPT | Performed by: EMERGENCY MEDICINE

## 2018-06-04 PROCEDURE — 25000128 H RX IP 250 OP 636: Performed by: EMERGENCY MEDICINE

## 2018-06-04 PROCEDURE — 99285 EMERGENCY DEPT VISIT HI MDM: CPT | Mod: Z6 | Performed by: EMERGENCY MEDICINE

## 2018-06-04 PROCEDURE — 96365 THER/PROPH/DIAG IV INF INIT: CPT | Performed by: EMERGENCY MEDICINE

## 2018-06-04 PROCEDURE — 96375 TX/PRO/DX INJ NEW DRUG ADDON: CPT | Performed by: EMERGENCY MEDICINE

## 2018-06-04 PROCEDURE — 99285 EMERGENCY DEPT VISIT HI MDM: CPT | Mod: 25 | Performed by: EMERGENCY MEDICINE

## 2018-06-04 PROCEDURE — 85025 COMPLETE CBC W/AUTO DIFF WBC: CPT | Performed by: EMERGENCY MEDICINE

## 2018-06-04 PROCEDURE — 81001 URINALYSIS AUTO W/SCOPE: CPT | Performed by: EMERGENCY MEDICINE

## 2018-06-04 RX ORDER — CEFTRIAXONE 1 G/1
1 INJECTION, POWDER, FOR SOLUTION INTRAMUSCULAR; INTRAVENOUS ONCE
Status: COMPLETED | OUTPATIENT
Start: 2018-06-04 | End: 2018-06-05

## 2018-06-04 RX ORDER — MORPHINE SULFATE 4 MG/ML
4 INJECTION, SOLUTION INTRAMUSCULAR; INTRAVENOUS ONCE
Status: COMPLETED | OUTPATIENT
Start: 2018-06-04 | End: 2018-06-04

## 2018-06-04 RX ADMIN — MORPHINE SULFATE 4 MG: 4 INJECTION INTRAVENOUS at 22:05

## 2018-06-04 RX ADMIN — SODIUM CHLORIDE 1000 ML: 9 INJECTION, SOLUTION INTRAVENOUS at 22:00

## 2018-06-04 RX ADMIN — CEFTRIAXONE 1 G: 1 INJECTION, POWDER, FOR SOLUTION INTRAMUSCULAR; INTRAVENOUS at 23:37

## 2018-06-04 ASSESSMENT — ENCOUNTER SYMPTOMS
DIARRHEA: 0
FLANK PAIN: 1
ARTHRALGIAS: 0
COLOR CHANGE: 0
EYE REDNESS: 0
NAUSEA: 0
VOMITING: 0
FREQUENCY: 1
CONFUSION: 0
DIFFICULTY URINATING: 0
ABDOMINAL PAIN: 1
HEMATURIA: 0
HEADACHES: 0
FEVER: 1
SHORTNESS OF BREATH: 0
DYSURIA: 1

## 2018-06-04 NOTE — IP AVS SNAPSHOT
[address] [facility] [address]    Phone:  [phone]    Fax:  [phone]                                       After Visit Summary   6/4/2018    Kiah Beaver    MRN: [id number]           After Visit Summary Signature Page     I have received my discharge instructions, and my questions have been answered. I have discussed any challenges I see with this plan with the nurse or doctor.    ..........................................................................................................................................  Patient/Patient Representative Signature      ..........................................................................................................................................  Patient Representative Print Name and Relationship to Patient    ..................................................               ................................................  Date                                            Time    ..........................................................................................................................................  Reviewed by Signature/Title    ...................................................              ..............................................  Date                                                            Time

## 2018-06-04 NOTE — IP AVS SNAPSHOT
MRN:[id number]                      After Visit Summary   6/4/2018    Kiah Beaver    MRN: [id number]           Thank you!     Thank you for choosing [facility] for your care. Our goal is always to provide you with excellent care. Hearing back from our patients is one way we can continue to improve our services. Please take a few minutes to complete the written survey that you may receive in the mail after you visit with us. Thank you!        Patient Information     Date Of Birth          1995        About your hospital stay     You were admitted on:  June 5, 2018 You last received care in the:  Unit 6D Observation [facility]    You were discharged on:  June 5, 2018        Reason for your hospital stay       Acute pyelonephritis                  Who to Call     For medical emergencies, please call 911.  For non-urgent questions about your medical care, please call your primary care provider or clinic, [phone]          Attending Provider     Provider Specialty    Noemi García MD Emergency Medicine    [facility], Derek Llanos MD Emergency Medicine       Primary Care Provider Office Phone # Fax #    Tamiko Lockhart [phone]       When to contact your care team       Return to the ER if you have worsening pain, fever, vomiting, or difficulty urinating.                  After Care Instructions     Activity       Your activity upon discharge: activity as tolerated            Diet       Follow this diet upon discharge: Orders Placed This Encounter      Regular Diet Adult                  Follow-up Appointments     Adult [facility] Follow-up and recommended labs and tests       Follow up with primary care provider, Tamiko Lockhart, within 7 days for hospital follow- up.     Appointments on [place] and/or [facility] (with [facility] or [facility] provider or service). Call [phone] if you haven't heard regarding these appointments within 7  "days of discharge.                  Pending Results     Date and Time Order Name Status Description    2018 2133 Urine Culture Preliminary             Statement of Approval     Ordered          18 1326  I have reviewed and agree with all the recommendations and orders detailed in this document.  EFFECTIVE NOW     Approved and electronically signed by:  Alexandria Bland APRN CNP             Admission Information     Date & Time Provider Department Dept. Phone    2018 Derek Claire MD Unit 6D Observation [facility] [phone]      Your Vitals Were     Blood Pressure Pulse Temperature Respirations Weight Last Period    117/77 (BP Location: Right arm) 68 98.5  F (36.9  C) (Oral) 16 55.7 kg (122 lb 14.4 oz) 05/10/2018 (Approximate)    Pulse Oximetry BMI (Body Mass Index)                97% 21.1 kg/m2          [employer] Information     [employer] lets you send messages to your doctor, view your test results, renew your prescriptions, schedule appointments and more. To sign up, go to [email or web address] . Click on \"Log in\" on the left side of the screen, which will take you to the Welcome page. Then click on \"Sign up Now\" on the right side of the page.     You will be asked to enter the access code listed below, as well as some personal information. Please follow the directions to create your username and password.     Your access code is: [email or web address]  Expires: 9/3/2018  2:11 PM     Your access code will  in 90 days. If you need help or a new code, please call your [place] clinic or [phone].        Care EveryWhere ID     This is your Care EveryWhere ID. This could be used by other organizations to access your [facility] medical records  [id number]        Equal Access to Services     [facility] HUMBERTO : Eber Ballard, jones mcintyre, luis ramon. So [facility] [phone].    ATENCIÓN: Si habla español, tiene a rod " disposición servicios gratuitos de asistencia lingüística. Leena boyd [phone].    We comply with applicable federal civil rights laws and [place] laws. We do not discriminate on the basis of race, color, national origin, age, disability, sex, sexual orientation, or gender identity.               Review of your medicines      START taking        Dose / Directions    cefdinir 300 MG capsule   Commonly known as:  OMNICEF   Used for:  Acute pyelonephritis        Dose:  300 mg   Take 1 capsule (300 mg) by mouth 2 times daily   Quantity:  20 capsule   Refills:  0         CONTINUE these medicines which have NOT CHANGED        Dose / Directions    ascorbic acid 500 MG tablet   Commonly known as:  VITAMIN C   Used for:  Recurrent UTI        Dose:  1000 mg   Take 2 tablets (1,000 mg) by mouth daily   Quantity:  180 tablet   Refills:  1       EFFEXOR PO        Dose:  225 mg   Take 225 mg by mouth daily   Refills:  0       etonogestrel-ethinyl estradiol 0.12-0.015 MG/24HR vaginal ring   Commonly known as:  NUVARING        Dose:  1 each   Place 1 each vaginally every 28 days   Refills:  0       methenamine hippurate 1 g Tabs tablet   Commonly known as:  HIPREX   Used for:  Recurrent UTI        Dose:  1 g   Take 1 tablet (1 g) by mouth daily   Quantity:  90 tablet   Refills:  1       XANAX PO        Dose:  0.25 mg   Take 0.25 mg by mouth every 4 hours as needed for anxiety   Refills:  0         STOP taking     amoxicillin-clavulanate 875-125 MG per tablet   Commonly known as:  AUGMENTIN                Where to get your medicines      These medications were sent to [place], [place] - [address] [place] [address]     Phone:  [phone]     cefdinir 300 MG capsule                Protect others around you: Learn how to safely use, store and throw away your medicines at www.disposemymeds.org.        ANTIBIOTIC INSTRUCTION     You've Been Prescribed an Antibiotic - Now  What?  Your healthcare team thinks that you or your loved one might have an infection. Some infections can be treated with antibiotics, which are powerful, life-saving drugs. Like all medications, antibiotics have side effects and should only be used when necessary. There are some important things you should know about your antibiotic treatment.      Your healthcare team may run tests before you start taking an antibiotic.    Your team may take samples (e.g., from your blood, urine or other areas) to run tests to look for bacteria. These test can be important to determine if you need an antibiotic at all and, if you do, which antibiotic will work best.      Within a few days, your healthcare team might change or even stop your antibiotic.    Your team may start you on an antibiotic while they are working to find out what is making you sick.    Your team might change your antibiotic because test results show that a different antibiotic would be better to treat your infection.    In some cases, once your team has more information, they learn that you do not need an antibiotic at all. They may find out that you don't have an infection, or that the antibiotic you're taking won't work against your infection. For example, an infection caused by a virus can't be treated with antibiotics. Staying on an antibiotic when you don't need it is more likely to be harmful than helpful.      You may experience side effects from your antibiotic.    Like all medications, antibiotics have side effects. Some of these can be serious.    Let you healthcare team know if you have any known allergies when you are admitted to the hospital.    One significant side effect of nearly all antibiotics is the risk of severe and sometimes deadly diarrhea caused by Clostridium difficile (C. Difficile). This occurs when a person takes antibiotics because some good germs are destroyed. Antibiotic use allows C. diificile to take over, putting patients at  high risk for this serious infection.    As a patient or caregiver, it is important to understand your or your loved one's antibiotic treatment. It is especially important for caregivers to speak up when patients can't speak for themselves. Here are some important questions to ask your healthcare team.    What infection is this antibiotic treating and how do you know I have that infection?    What side effects might occur from this antibiotic?    How long will I need to take this antibiotic?    Is it safe to take this antibiotic with other medications or supplements (e.g., vitamins) that I am taking?     Are there any special directions I need to know about taking this antibiotic? For example, should I take it with food?    How will I be monitored to know whether my infection is responding to the antibiotic?    What tests may help to make sure the right antibiotic is prescribed for me?      Information provided by:  www.cdc.gov/getsmart  U.S. Department of Health and Human Services  Centers for disease Control and Prevention  National Center for Emerging and Zoonotic Infectious Diseases  Division of Healthcare Quality Promotion             Medication List: This is a list of all your medications and when to take them. Check marks below indicate your daily home schedule. Keep this list as a reference.      Medications           Morning Afternoon Evening Bedtime As Needed    ascorbic acid 500 MG tablet   Commonly known as:  VITAMIN C   Take 2 tablets (1,000 mg) by mouth daily                                cefdinir 300 MG capsule   Commonly known as:  OMNICEF   Take 1 capsule (300 mg) by mouth 2 times daily                                EFFEXOR PO   Take 225 mg by mouth daily   Last time this was given:  225 mg on 6/5/2018  8:10 AM                                etonogestrel-ethinyl estradiol 0.12-0.015 MG/24HR vaginal ring   Commonly known as:  NUVARING   Place 1 each vaginally every 28 days                                 methenamine hippurate 1 g Tabs tablet   Commonly known as:  HIPREX   Take 1 tablet (1 g) by mouth daily   Last time this was given:  1 g on 6/5/2018  8:10 AM                                XANAX PO   Take 0.25 mg by mouth every 4 hours as needed for anxiety                                          More Information        Discharge Instructions for Pyelonephritis  You have been told you have a kidney infection. This is called pyelonephritis. The infection can be serious. It can damage your kidneys and cause bacteria to enter your bloodstream. You were treated in the hospital. Once you return home, here s what you can do at home to aid in your recovery and prevent future infections.  Home care    Take all the medicine you were prescribed, even if you feel better. Not finishing the medicine can make the infection come back. It may also make a future infection harder to treat.    Unless told not to by your healthcare provider, drink 8 to 12 glasses of fluid every day. Clear fluids, such as water, are best. This may help flush the infection from your system.  Preventing future infection    Keep your genital area clean. Use mild soap. Rinse with water.    If you are a woman, always wipe the genital area from front to back.    Urinate frequently. Avoid holding urine in the bladder for a long time.    Always urinate after sexual intercourse.  Follow-up care  Follow up with your healthcare provider, or as advised. And see your healthcare provider for regular lab tests as directed.     When to call your healthcare provider  Call your healthcare provider right away if you have any of the following:    Decreased urine output or trouble urinating    Severe pain in the lower back or flank    Fever of 100.4 F (38 C) or higher, or as directed by your healthcare provider    Shaking chills    Vomiting    Blood in your urine    Dark-colored or foul-smelling urine    Nausea or other problems that prevent you from taking your  prescribed medicine   Date Last Reviewed: 2/1/2017 2000-2017 [employer]. [address] [place] [address], [place] [address]. All rights reserved. This information is not intended as a substitute for professional medical care. Always follow your healthcare professional's instructions.

## 2018-06-05 VITALS
DIASTOLIC BLOOD PRESSURE: 77 MMHG | BODY MASS INDEX: 21.1 KG/M2 | RESPIRATION RATE: 16 BRPM | WEIGHT: 122.9 LBS | HEART RATE: 68 BPM | TEMPERATURE: 98.5 F | OXYGEN SATURATION: 97 % | SYSTOLIC BLOOD PRESSURE: 117 MMHG

## 2018-06-05 PROBLEM — N12 PYELONEPHRITIS: Status: ACTIVE | Noted: 2018-06-05

## 2018-06-05 LAB
ALBUMIN SERPL-MCNC: 3.3 G/DL (ref 3.4–5)
ALP SERPL-CCNC: 52 U/L (ref 40–150)
ALT SERPL W P-5'-P-CCNC: 20 U/L (ref 0–50)
ANION GAP SERPL CALCULATED.3IONS-SCNC: 7 MMOL/L (ref 3–14)
AST SERPL W P-5'-P-CCNC: 22 U/L (ref 0–45)
BACTERIA SPEC CULT: NO GROWTH
BASOPHILS # BLD AUTO: 0 10E9/L (ref 0–0.2)
BASOPHILS NFR BLD AUTO: 0.6 %
BILIRUB SERPL-MCNC: 0.6 MG/DL (ref 0.2–1.3)
BUN SERPL-MCNC: 8 MG/DL (ref 7–30)
CALCIUM SERPL-MCNC: 8.3 MG/DL (ref 8.5–10.1)
CHLORIDE SERPL-SCNC: 108 MMOL/L (ref 94–109)
CO2 SERPL-SCNC: 25 MMOL/L (ref 20–32)
CREAT SERPL-MCNC: 0.77 MG/DL (ref 0.52–1.04)
CRP SERPL-MCNC: <2.9 MG/L (ref 0–8)
DIFFERENTIAL METHOD BLD: NORMAL
EOSINOPHIL # BLD AUTO: 0.1 10E9/L (ref 0–0.7)
EOSINOPHIL NFR BLD AUTO: 1.7 %
ERYTHROCYTE [DISTWIDTH] IN BLOOD BY AUTOMATED COUNT: 12.1 % (ref 10–15)
GFR SERPL CREATININE-BSD FRML MDRD: >90 ML/MIN/1.7M2
GLUCOSE SERPL-MCNC: 82 MG/DL (ref 70–99)
HCT VFR BLD AUTO: 35.2 % (ref 35–47)
HGB BLD-MCNC: 11.8 G/DL (ref 11.7–15.7)
IMM GRANULOCYTES # BLD: 0 10E9/L (ref 0–0.4)
IMM GRANULOCYTES NFR BLD: 0 %
LYMPHOCYTES # BLD AUTO: 2.4 10E9/L (ref 0.8–5.3)
LYMPHOCYTES NFR BLD AUTO: 51.6 %
Lab: NORMAL
MCH RBC QN AUTO: 28.9 PG (ref 26.5–33)
MCHC RBC AUTO-ENTMCNC: 33.5 G/DL (ref 31.5–36.5)
MCV RBC AUTO: 86 FL (ref 78–100)
MONOCYTES # BLD AUTO: 0.3 10E9/L (ref 0–1.3)
MONOCYTES NFR BLD AUTO: 6 %
NEUTROPHILS # BLD AUTO: 1.9 10E9/L (ref 1.6–8.3)
NEUTROPHILS NFR BLD AUTO: 40.1 %
NRBC # BLD AUTO: 0 10*3/UL
NRBC BLD AUTO-RTO: 0 /100
PLATELET # BLD AUTO: 151 10E9/L (ref 150–450)
POTASSIUM SERPL-SCNC: 4 MMOL/L (ref 3.4–5.3)
PROT SERPL-MCNC: 6.5 G/DL (ref 6.8–8.8)
RBC # BLD AUTO: 4.08 10E12/L (ref 3.8–5.2)
SODIUM SERPL-SCNC: 140 MMOL/L (ref 133–144)
SPECIMEN SOURCE: NORMAL
WBC # BLD AUTO: 4.7 10E9/L (ref 4–11)

## 2018-06-05 PROCEDURE — 25000128 H RX IP 250 OP 636: Performed by: PHYSICIAN ASSISTANT

## 2018-06-05 PROCEDURE — G0378 HOSPITAL OBSERVATION PER HR: HCPCS

## 2018-06-05 PROCEDURE — 85025 COMPLETE CBC W/AUTO DIFF WBC: CPT | Performed by: PHYSICIAN ASSISTANT

## 2018-06-05 PROCEDURE — 86140 C-REACTIVE PROTEIN: CPT | Performed by: PHYSICIAN ASSISTANT

## 2018-06-05 PROCEDURE — 25000132 ZZH RX MED GY IP 250 OP 250 PS 637: Performed by: NURSE PRACTITIONER

## 2018-06-05 PROCEDURE — 99235 HOSP IP/OBS SAME DATE MOD 70: CPT | Mod: Z6 | Performed by: INTERNAL MEDICINE

## 2018-06-05 PROCEDURE — 80053 COMPREHEN METABOLIC PANEL: CPT | Performed by: PHYSICIAN ASSISTANT

## 2018-06-05 PROCEDURE — 25000132 ZZH RX MED GY IP 250 OP 250 PS 637: Performed by: PHYSICIAN ASSISTANT

## 2018-06-05 PROCEDURE — 36415 COLL VENOUS BLD VENIPUNCTURE: CPT | Performed by: PHYSICIAN ASSISTANT

## 2018-06-05 RX ORDER — SODIUM CHLORIDE 9 MG/ML
INJECTION, SOLUTION INTRAVENOUS CONTINUOUS
Status: DISCONTINUED | OUTPATIENT
Start: 2018-06-05 | End: 2018-06-05 | Stop reason: HOSPADM

## 2018-06-05 RX ORDER — ALPRAZOLAM 0.25 MG
0.25 TABLET ORAL EVERY 4 HOURS PRN
Status: DISCONTINUED | OUTPATIENT
Start: 2018-06-05 | End: 2018-06-05 | Stop reason: HOSPADM

## 2018-06-05 RX ORDER — IBUPROFEN 200 MG
200-400 TABLET ORAL EVERY 6 HOURS PRN
Status: DISCONTINUED | OUTPATIENT
Start: 2018-06-05 | End: 2018-06-05 | Stop reason: HOSPADM

## 2018-06-05 RX ORDER — NALOXONE HYDROCHLORIDE 0.4 MG/ML
.1-.4 INJECTION, SOLUTION INTRAMUSCULAR; INTRAVENOUS; SUBCUTANEOUS
Status: DISCONTINUED | OUTPATIENT
Start: 2018-06-05 | End: 2018-06-05 | Stop reason: HOSPADM

## 2018-06-05 RX ORDER — CEFTRIAXONE 2 G/1
2 INJECTION, POWDER, FOR SOLUTION INTRAMUSCULAR; INTRAVENOUS EVERY 24 HOURS
Status: DISCONTINUED | OUTPATIENT
Start: 2018-06-05 | End: 2018-06-05 | Stop reason: HOSPADM

## 2018-06-05 RX ORDER — CEFTRIAXONE 1 G/1
1 INJECTION, POWDER, FOR SOLUTION INTRAMUSCULAR; INTRAVENOUS ONCE
Status: DISCONTINUED | OUTPATIENT
Start: 2018-06-05 | End: 2018-06-05

## 2018-06-05 RX ORDER — CEFDINIR 300 MG/1
300 CAPSULE ORAL 2 TIMES DAILY
Qty: 20 CAPSULE | Refills: 0 | Status: SHIPPED | OUTPATIENT
Start: 2018-06-05 | End: 2018-11-26

## 2018-06-05 RX ORDER — ONDANSETRON 2 MG/ML
4 INJECTION INTRAMUSCULAR; INTRAVENOUS EVERY 6 HOURS PRN
Status: DISCONTINUED | OUTPATIENT
Start: 2018-06-05 | End: 2018-06-05 | Stop reason: HOSPADM

## 2018-06-05 RX ORDER — ACETAMINOPHEN 325 MG/1
650 TABLET ORAL EVERY 4 HOURS PRN
Status: DISCONTINUED | OUTPATIENT
Start: 2018-06-05 | End: 2018-06-05 | Stop reason: HOSPADM

## 2018-06-05 RX ORDER — VENLAFAXINE 75 MG/1
225 TABLET ORAL DAILY
Status: DISCONTINUED | OUTPATIENT
Start: 2018-06-05 | End: 2018-06-05 | Stop reason: HOSPADM

## 2018-06-05 RX ORDER — KETOROLAC TROMETHAMINE 30 MG/ML
30 INJECTION, SOLUTION INTRAMUSCULAR; INTRAVENOUS EVERY 6 HOURS PRN
Status: DISCONTINUED | OUTPATIENT
Start: 2018-06-05 | End: 2018-06-05

## 2018-06-05 RX ORDER — ACETAMINOPHEN 650 MG/1
650 SUPPOSITORY RECTAL EVERY 4 HOURS PRN
Status: DISCONTINUED | OUTPATIENT
Start: 2018-06-05 | End: 2018-06-05 | Stop reason: HOSPADM

## 2018-06-05 RX ORDER — METHENAMINE HIPPURATE 1000 MG/1
1 TABLET ORAL DAILY
Status: DISCONTINUED | OUTPATIENT
Start: 2018-06-05 | End: 2018-06-05 | Stop reason: HOSPADM

## 2018-06-05 RX ORDER — ONDANSETRON 4 MG/1
4 TABLET, ORALLY DISINTEGRATING ORAL EVERY 6 HOURS PRN
Status: DISCONTINUED | OUTPATIENT
Start: 2018-06-05 | End: 2018-06-05 | Stop reason: HOSPADM

## 2018-06-05 RX ADMIN — SODIUM CHLORIDE: 9 INJECTION, SOLUTION INTRAVENOUS at 08:09

## 2018-06-05 RX ADMIN — METHENAMINE HIPPURATE 1 G: 1 TABLET ORAL at 08:10

## 2018-06-05 RX ADMIN — IBUPROFEN 400 MG: 200 TABLET, FILM COATED ORAL at 10:17

## 2018-06-05 RX ADMIN — VENLAFAXINE HYDROCHLORIDE 225 MG: 75 TABLET ORAL at 08:10

## 2018-06-05 NOTE — PROGRESS NOTES
Discharge instructions reviewed with patient, questions answered. PIV removed. Patient walked out on own.

## 2018-06-05 NOTE — PROGRESS NOTES
-diagnostic tests and consults completed and resulted:pending  -vital signs normal or at patient baseline: met, afebrile on RA.  -tolerating oral intake to maintain hydration: met  -adequate pain control on oral analgesics: pending   -tolerating oral antibiotics or has plans for home infusion setup: pending   -infection is improving: not met   -returns to baseline functional status: pending   -safe disposition plan has been identified: met

## 2018-06-05 NOTE — PLAN OF CARE
Problem: Patient Care Overview  Goal: Plan of Care/Patient Progress Review  Outcome: No Change  Outpatient/Observation goals to be met before discharge home:  -diagnostic tests and consults completed and resulted:  YES   -vital signs normal or at patient baseline : YES  -tolerating oral intake to maintain hydration : YES  -adequate pain control on oral analgesics : YES   -tolerating oral antibiotics or has plans for home infusion setup : YES  -infection is improving : YES  -returns to baseline functional status : YES  -safe disposition plan has been identified : YES

## 2018-06-05 NOTE — DISCHARGE SUMMARY
Discharge Summary    Kiah Beaver MRN# [id number]   YOB: 1995 Age: 23 year old     Date of Admission:  6/4/2018  Date of Discharge:  6/5/2018  Admitting Physician:  Roger Christianson MD  Discharge Physician:  Derek Claire  Discharging Service:  Emergency Medicine     Primary Provider: Tamiko Lockhart          Discharge Diagnosis:     Pyelonephritis    * No resolved hospital problems. *               Discharge Disposition:   Discharged to home           Condition on Discharge:   Discharge condition: Stable   Code status on discharge: Full Code           Procedures:   No procedures performed during this admission          Discharge Medications:     Current Discharge Medication List      START taking these medications    Details   cefdinir (OMNICEF) 300 MG capsule Take 1 capsule (300 mg) by mouth 2 times daily  Qty: 20 capsule, Refills: 0    Associated Diagnoses: Acute pyelonephritis         CONTINUE these medications which have NOT CHANGED    Details   etonogestrel-ethinyl estradiol (NUVARING) 0.12-0.015 MG/24HR vaginal ring Place 1 each vaginally every 28 days      methenamine hippurate (HIPREX) 1 G TABS tablet Take 1 tablet (1 g) by mouth daily  Qty: 90 tablet, Refills: 1    Associated Diagnoses: Recurrent UTI      Venlafaxine HCl (EFFEXOR PO) Take 225 mg by mouth daily       ALPRAZolam (XANAX PO) Take 0.25 mg by mouth every 4 hours as needed for anxiety      ascorbic acid (VITAMIN C) 500 MG tablet Take 2 tablets (1,000 mg) by mouth daily  Qty: 180 tablet, Refills: 1    Associated Diagnoses: Recurrent UTI         STOP taking these medications       amoxicillin-clavulanate (AUGMENTIN) 875-125 MG per tablet Comments:   Reason for Stopping:                     Consultations:   No consultations were requested during this admission             Brief History of Illness:   Please see detailed H&P from 6/5/18, in brief: Kiah Beaver is a 23 year old female with a history of congenital bilateral  hearing loss, depression, anxiety, recent recurrent UTI's who presented to the ED with UTI symptoms, bilateral flank pain and fevers.           Hospital Course:   1. Pyelonephritis: UTI symptoms, bilateral flank pain and fevers. Reports a week of urinary urgency, frequency, dysuria. Fever of 100-101 in the last 2 days. Seen at [facility] urgent care clinic yesterday afternoon with UA revealing trace leukocyte esterase. D/w acute pyelonephritis and placed on Augmentin and pyridium. Took 2 doses of Augmentin as well as the pyridium without improvement. Denies nausea, vomiting, diarrhea, abnormal vaginal discharge. In ED, HR 90's, 's/80's, RR 18, SaO2 % on RA, Temp 98.1. Labs show normal BMP, CBC. BHcg negative. UA with 10 protein, positive nitrite, moderate LE, 13 WBC, 4 RBC, 5 SE, amorphous crystals present. In the ED the patient was given Rocephin 1gm IV x 1, 1L NS bolus, morphine 4mg IV x 1. She had improved symptoms, afebrile while in the obs unit. Eating well.  Stable for discharge.  UC negative so far.  Patient discharged on Omnicef 300 mg PO BID X 10 days.  Encouraged her to follow up with her PCP, when to return to the ER reviewed with the patient.  Patient discharged to home in good condition.        Chronic Medical Problems:  ## Recurrent UTI: - Continue Hiprex     ## Depression/Anxiety: - Continue Effexor            Final Day of Progress before Discharge:       Physical Exam:  Blood pressure 117/77, pulse 68, temperature 98.5  F (36.9  C), temperature source Oral, resp. rate 16, weight 55.7 kg (122 lb 14.4 oz), last menstrual period 05/10/2018, SpO2 97 %, not currently breastfeeding.    EXAM:  Exam:  Constitutional: healthy, alert and no distress  Cardiovascular: No lifts, heaves, or thrills. RRR. No murmurs, clicks gallops or rub  Respiratory: Good diaphragmatic excursion. Lungs clear  Gastrointestinal: Abdomen soft, non-tender. BS normal. No masses, organomegaly  Musculoskeletal:  extremities normal- no gross deformities noted, gait normal and normal muscle tone  Skin: no suspicious lesions or rashes  Neurologic: Gait normal. Alert and oriented.   Psychiatric: mentation appears normal and affect normal/bright    /77 (BP Location: Right arm)  Pulse 68  Temp 98.5  F (36.9  C) (Oral)  Resp 16  Wt 55.7 kg (122 lb 14.4 oz)  LMP 05/10/2018 (Approximate)  SpO2 97%  BMI 21.1 kg/m2             Data:  All laboratory data reviewed             Significant Results:     Results for orders placed or performed during the hospital encounter of 06/04/18   Routine UA with microscopic   Result Value Ref Range    Color Urine Orange     Appearance Urine Slightly Cloudy     Glucose Urine Negative NEG^Negative mg/dL    Bilirubin Urine Small (A) NEG^Negative    Ketones Urine Negative NEG^Negative mg/dL    Specific Gravity Urine 1.026 1.003 - 1.035    Blood Urine Negative NEG^Negative    pH Urine 7.0 5.0 - 7.0 pH    Protein Albumin Urine 10 (A) NEG^Negative mg/dL    Urobilinogen mg/dL 4.0 (H) 0.0 - 2.0 mg/dL    Nitrite Urine Positive (A) NEG^Negative    Leukocyte Esterase Urine Moderate (A) NEG^Negative    Source Clean catch urine     WBC Urine 13 (H) 0 - 5 /HPF    RBC Urine 4 (H) 0 - 2 /HPF    Bacteria Urine Few (A) NEG^Negative /HPF    Squamous Epithelial /HPF Urine 5 (H) 0 - 1 /HPF    Mucous Urine Present (A) NEG^Negative /LPF    Amorphous Crystals Few (A) NEG^Negative /HPF   CBC with platelets differential   Result Value Ref Range    WBC 5.2 4.0 - 11.0 10e9/L    RBC Count 4.37 3.8 - 5.2 10e12/L    Hemoglobin 12.7 11.7 - 15.7 g/dL    Hematocrit 37.2 35.0 - 47.0 %    MCV 85 78 - 100 fl    MCH 29.1 26.5 - 33.0 pg    MCHC 34.1 31.5 - 36.5 g/dL    RDW 12.0 10.0 - 15.0 %    Platelet Count 180 150 - 450 10e9/L    Diff Method Automated Method     % Neutrophils 49.7 %    % Lymphocytes 43.5 %    % Monocytes 5.0 %    % Eosinophils 1.0 %    % Basophils 0.6 %    % Immature Granulocytes 0.2 %    Nucleated RBCs 0 0  /100    Absolute Neutrophil 2.6 1.6 - 8.3 10e9/L    Absolute Lymphocytes 2.3 0.8 - 5.3 10e9/L    Absolute Monocytes 0.3 0.0 - 1.3 10e9/L    Absolute Eosinophils 0.1 0.0 - 0.7 10e9/L    Absolute Basophils 0.0 0.0 - 0.2 10e9/L    Abs Immature Granulocytes 0.0 0 - 0.4 10e9/L    Absolute Nucleated RBC 0.0    Basic metabolic panel   Result Value Ref Range    Sodium 140 133 - 144 mmol/L    Potassium 3.6 3.4 - 5.3 mmol/L    Chloride 105 94 - 109 mmol/L    Carbon Dioxide 29 20 - 32 mmol/L    Anion Gap 6 3 - 14 mmol/L    Glucose 80 70 - 99 mg/dL    Urea Nitrogen 9 7 - 30 mg/dL    Creatinine 0.80 0.52 - 1.04 mg/dL    GFR Estimate 90 >60 mL/min/1.7m2    GFR Estimate If Black >90 >60 mL/min/1.7m2    Calcium 8.7 8.5 - 10.1 mg/dL   CBC with platelets differential   Result Value Ref Range    WBC 4.7 4.0 - 11.0 10e9/L    RBC Count 4.08 3.8 - 5.2 10e12/L    Hemoglobin 11.8 11.7 - 15.7 g/dL    Hematocrit 35.2 35.0 - 47.0 %    MCV 86 78 - 100 fl    MCH 28.9 26.5 - 33.0 pg    MCHC 33.5 31.5 - 36.5 g/dL    RDW 12.1 10.0 - 15.0 %    Platelet Count 151 150 - 450 10e9/L    Diff Method Automated Method     % Neutrophils 40.1 %    % Lymphocytes 51.6 %    % Monocytes 6.0 %    % Eosinophils 1.7 %    % Basophils 0.6 %    % Immature Granulocytes 0.0 %    Nucleated RBCs 0 0 /100    Absolute Neutrophil 1.9 1.6 - 8.3 10e9/L    Absolute Lymphocytes 2.4 0.8 - 5.3 10e9/L    Absolute Monocytes 0.3 0.0 - 1.3 10e9/L    Absolute Eosinophils 0.1 0.0 - 0.7 10e9/L    Absolute Basophils 0.0 0.0 - 0.2 10e9/L    Abs Immature Granulocytes 0.0 0 - 0.4 10e9/L    Absolute Nucleated RBC 0.0    Comprehensive metabolic panel   Result Value Ref Range    Sodium 140 133 - 144 mmol/L    Potassium 4.0 3.4 - 5.3 mmol/L    Chloride 108 94 - 109 mmol/L    Carbon Dioxide 25 20 - 32 mmol/L    Anion Gap 7 3 - 14 mmol/L    Glucose 82 70 - 99 mg/dL    Urea Nitrogen 8 7 - 30 mg/dL    Creatinine 0.77 0.52 - 1.04 mg/dL    GFR Estimate >90 >60 mL/min/1.7m2    GFR Estimate If Black >90  >60 mL/min/1.7m2    Calcium 8.3 (L) 8.5 - 10.1 mg/dL    Bilirubin Total 0.6 0.2 - 1.3 mg/dL    Albumin 3.3 (L) 3.4 - 5.0 g/dL    Protein Total 6.5 (L) 6.8 - 8.8 g/dL    Alkaline Phosphatase 52 40 - 150 U/L    ALT 20 0 - 50 U/L    AST 22 0 - 45 U/L   CRP inflammation   Result Value Ref Range    CRP Inflammation <2.9 0.0 - 8.0 mg/L   hCG qual urine POCT   Result Value Ref Range    HCG Qual Urine Negative neg    Internal QC OK Yes    Urine Culture   Result Value Ref Range    Specimen Description Midstream Urine     Special Requests Specimen received in preservative     Culture Micro Culture negative < 24 hours, reincubate       No results found for this or any previous visit (from the past 48 hour(s)).             Pending Results:   Unresulted Labs Ordered in the Past 30 Days of this Admission     Date and Time Order Name Status Description    6/4/2018 2133 Urine Culture Preliminary                   Discharge Instructions and Follow-Up:     Discharge Procedure Orders  Reason for your hospital stay   Order Comments: Acute pyelonephritis     Adult [facility] Follow-up and recommended labs and tests   Order Comments: Follow up with primary care provider, Tamiko Lockhart, within 7 days for hospital follow- up.     Appointments on [place] and/or [facility] (with [facility] or [facility] provider or service). Call [phone] if you haven't heard regarding these appointments within 7 days of discharge.     Activity   Order Comments: Your activity upon discharge: activity as tolerated   Order Specific Question Answer Comments   Is discharge order? Yes      When to contact your care team   Order Comments: Return to the ER if you have worsening pain, fever, vomiting, or difficulty urinating.     Diet   Order Comments: Follow this diet upon discharge: Orders Placed This Encounter     Regular Diet Adult   Order Specific Question Answer Comments   Is discharge order? Yes             Attestation:  Alexandria Bland.  APRN, CNP

## 2018-06-05 NOTE — PROGRESS NOTES
Patient interviewed and examined. Labs, imaging and chart notes reviewed.  Kiah Beaver has a history of recurrent UTI. She has been followed for this at [facility] and urology clinic. 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 She has tried a variety of strategies including post coital bactrim for UTI, but continues to get infections. She was seen a few months ago in Urology and options of chronic low dose nitrofurantoin vs Hippurex and vitamin C were discussed. She opted for the later. She presented to the ED yesterday with UTI symptoms not resolving despite treatment with oral Augmentin for 24 hours. She had developed bilateral flank pain and low grade fevers in addition to dysuria and hematuria. She was admitted for IV antibiotics. She has had several recent courses of macrodantin. She denies URI symptoms, cough, sputum, shortness of breath, nausea, vomiting or diarrhea. Today her flank pain has resolved. She continues to have mild suprapubic pain and mild dysuria.    PAST MEDICAL HISTORY:   Past Medical History:   Diagnosis Date     Anxiety      Depressive disorder      Hearing loss     bilateral     UTI (urinary tract infection)        PAST SURGICAL HISTORY:   Past Surgical History:   Procedure Laterality Date     ENT SURGERY      wisdom teeth extraction       FAMILY HISTORY:   Family History   Problem Relation Age of Onset     Bipolar Disorder Maternal Grandmother      Depression Paternal Aunt      Anxiety Disorder Paternal Aunt      Substance Abuse Paternal Aunt      Depression Paternal Aunt      Anxiety Disorder Paternal Aunt      Depression Maternal Uncle      Anxiety Disorder Maternal Uncle      Suicide Maternal Uncle      Substance Abuse Maternal Uncle        SOCIAL HISTORY:   Social History   Substance Use Topics     Smoking status: Never Smoker     Smokeless tobacco: Never Used     Alcohol use No     Physical Exam:  Young female in NAD.  /77 (BP Location: Right arm)  Pulse 68  Temp  98.5  F (36.9  C) (Oral)  Resp 16  Wt 55.7 kg (122 lb 14.4 oz)  LMP 05/10/2018 (Approximate)  SpO2 97%  BMI 21.1 kg/m2   HEENT: PERRLA EOMI.  Neck: No mass or bruit.  Lungs: Clear.  Back: No CVA tenderness.  Cardiac: RRR. Normal S1 and S2. No murmurs.  Abdomen: Minimal suprapubic tenderness. BS active.  Skin: No rash.    Labs/Imaging    Results for orders placed or performed during the hospital encounter of 06/04/18 (from the past 24 hour(s))   Routine UA with microscopic   Result Value Ref Range    Color Urine Orange     Appearance Urine Slightly Cloudy     Glucose Urine Negative NEG^Negative mg/dL    Bilirubin Urine Small (A) NEG^Negative    Ketones Urine Negative NEG^Negative mg/dL    Specific Gravity Urine 1.026 1.003 - 1.035    Blood Urine Negative NEG^Negative    pH Urine 7.0 5.0 - 7.0 pH    Protein Albumin Urine 10 (A) NEG^Negative mg/dL    Urobilinogen mg/dL 4.0 (H) 0.0 - 2.0 mg/dL    Nitrite Urine Positive (A) NEG^Negative    Leukocyte Esterase Urine Moderate (A) NEG^Negative    Source Clean catch urine     WBC Urine 13 (H) 0 - 5 /HPF    RBC Urine 4 (H) 0 - 2 /HPF    Bacteria Urine Few (A) NEG^Negative /HPF    Squamous Epithelial /HPF Urine 5 (H) 0 - 1 /HPF    Mucous Urine Present (A) NEG^Negative /LPF    Amorphous Crystals Few (A) NEG^Negative /HPF   Urine Culture   Result Value Ref Range    Specimen Description Midstream Urine     Special Requests Specimen received in preservative     Culture Micro Culture negative < 24 hours, reincubate    hCG qual urine POCT   Result Value Ref Range    HCG Qual Urine Negative neg    Internal QC OK Yes    CBC with platelets differential   Result Value Ref Range    WBC 5.2 4.0 - 11.0 10e9/L    RBC Count 4.37 3.8 - 5.2 10e12/L    Hemoglobin 12.7 11.7 - 15.7 g/dL    Hematocrit 37.2 35.0 - 47.0 %    MCV 85 78 - 100 fl    MCH 29.1 26.5 - 33.0 pg    MCHC 34.1 31.5 - 36.5 g/dL    RDW 12.0 10.0 - 15.0 %    Platelet Count 180 150 - 450 10e9/L    Diff Method Automated Method      % Neutrophils 49.7 %    % Lymphocytes 43.5 %    % Monocytes 5.0 %    % Eosinophils 1.0 %    % Basophils 0.6 %    % Immature Granulocytes 0.2 %    Nucleated RBCs 0 0 /100    Absolute Neutrophil 2.6 1.6 - 8.3 10e9/L    Absolute Lymphocytes 2.3 0.8 - 5.3 10e9/L    Absolute Monocytes 0.3 0.0 - 1.3 10e9/L    Absolute Eosinophils 0.1 0.0 - 0.7 10e9/L    Absolute Basophils 0.0 0.0 - 0.2 10e9/L    Abs Immature Granulocytes 0.0 0 - 0.4 10e9/L    Absolute Nucleated RBC 0.0    Basic metabolic panel   Result Value Ref Range    Sodium 140 133 - 144 mmol/L    Potassium 3.6 3.4 - 5.3 mmol/L    Chloride 105 94 - 109 mmol/L    Carbon Dioxide 29 20 - 32 mmol/L    Anion Gap 6 3 - 14 mmol/L    Glucose 80 70 - 99 mg/dL    Urea Nitrogen 9 7 - 30 mg/dL    Creatinine 0.80 0.52 - 1.04 mg/dL    GFR Estimate 90 >60 mL/min/1.7m2    GFR Estimate If Black >90 >60 mL/min/1.7m2    Calcium 8.7 8.5 - 10.1 mg/dL   CBC with platelets differential   Result Value Ref Range    WBC 4.7 4.0 - 11.0 10e9/L    RBC Count 4.08 3.8 - 5.2 10e12/L    Hemoglobin 11.8 11.7 - 15.7 g/dL    Hematocrit 35.2 35.0 - 47.0 %    MCV 86 78 - 100 fl    MCH 28.9 26.5 - 33.0 pg    MCHC 33.5 31.5 - 36.5 g/dL    RDW 12.1 10.0 - 15.0 %    Platelet Count 151 150 - 450 10e9/L    Diff Method Automated Method     % Neutrophils 40.1 %    % Lymphocytes 51.6 %    % Monocytes 6.0 %    % Eosinophils 1.7 %    % Basophils 0.6 %    % Immature Granulocytes 0.0 %    Nucleated RBCs 0 0 /100    Absolute Neutrophil 1.9 1.6 - 8.3 10e9/L    Absolute Lymphocytes 2.4 0.8 - 5.3 10e9/L    Absolute Monocytes 0.3 0.0 - 1.3 10e9/L    Absolute Eosinophils 0.1 0.0 - 0.7 10e9/L    Absolute Basophils 0.0 0.0 - 0.2 10e9/L    Abs Immature Granulocytes 0.0 0 - 0.4 10e9/L    Absolute Nucleated RBC 0.0    Comprehensive metabolic panel   Result Value Ref Range    Sodium 140 133 - 144 mmol/L    Potassium 4.0 3.4 - 5.3 mmol/L    Chloride 108 94 - 109 mmol/L    Carbon Dioxide 25 20 - 32 mmol/L    Anion Gap 7 3 - 14  mmol/L    Glucose 82 70 - 99 mg/dL    Urea Nitrogen 8 7 - 30 mg/dL    Creatinine 0.77 0.52 - 1.04 mg/dL    GFR Estimate >90 >60 mL/min/1.7m2    GFR Estimate If Black >90 >60 mL/min/1.7m2    Calcium 8.3 (L) 8.5 - 10.1 mg/dL    Bilirubin Total 0.6 0.2 - 1.3 mg/dL    Albumin 3.3 (L) 3.4 - 5.0 g/dL    Protein Total 6.5 (L) 6.8 - 8.8 g/dL    Alkaline Phosphatase 52 40 - 150 U/L    ALT 20 0 - 50 U/L    AST 22 0 - 45 U/L   CRP inflammation   Result Value Ref Range    CRP Inflammation <2.9 0.0 - 8.0 mg/L     Impression:  Acute UTI in a young woman with a history of chronic, recurrent UTIs. There is not much indication of pyelonephritis on exam or labs. Urine cultures are pending. She has had several courses of Macrobid recently. Symptoms did not improve on Augmentin. She has intolerance of Cipro. She is improved after ceftriaxone on the ED observation unit. I think she can go home. Omnicef would be my choice of antibiotic pending culture.    Derek Claire MD

## 2018-06-05 NOTE — PLAN OF CARE
Problem: Patient Care Overview  Goal: Plan of Care/Patient Progress Review  Outcome: No Change  Neuro: A&Ox4. Soft spoken.  Cardiac: VSS wnl on RA, denies chest pain.  Resp: sating high 90s on RA.  GI/: Voiding independently overnight. No BM. Flank and abdominal pain controlled with 1x dose of morphine administered in ED.  Diet/Appetite: Denies nausea, tolerating regular diet.  Skin: no deficits  Access: :L) PIV  Drains: none  Activity: up ad isabella  Pain: denied pain overnight.     Will continue with plan of care and notify MD of any changes.

## 2018-06-05 NOTE — ED PROVIDER NOTES
History     Chief Complaint   Patient presents with     Rule out Urinary Tract Infection     The history is provided by the patient.     Kiah Beaver is a 23 year old female with a history of idiopathic bilateral hearing loss, frequent UTIs, anxiety, and depression who presents to the emergency department for evaluation of UTI symptoms, bilateral flank pain, and fevers.     The patient reports that over the past 1 week she has been having urinary urgency and frequency, bilateral flank pain, and dysuria. She states that she was seen at [facility] urgent care clinic yesterday afternoon where she had urinalysis revealing trace leukocyte esterase. She was diagnosed with acute pyelonephritis and was placed on Augmentin and pyridium. The patient states that she has been taking Tylenol which have been keeping her fevers down, though she has had elevated temperatures today still with a maximum of 100.5 F recorded.  She also has since taken 2 doses of the Augmentin (with her third dose to be taken soon), as well as the pyridium, but she states that her symptoms have not at all improved and have perhaps worsened somewhat.  She was advised to come to the ER with any worsening or persistence of symptoms, prompting arrival here today.  Other than Tylenol she states she has taken nothing else for fever or pain.  She denies vomiting or nausea, states that she has been keeping down the antibiotic and other medications without difficulty.  The patient does report a history of chronic recurrent bladder infections but denies history of kidney infection.  She states that her last such UTI was approximately 1 month ago at which time she was treated with Macrobid. She has seen urology and is taking Hiprex daily.    In addition to ongoing urinary frequency and urgency, she reports continued bilateral flank pain and discomfort in the suprapubic abdomen as well.  She denies diarrhea.  Patient notes she was diagnosed with BV  "approximately 2 weeks ago, at which time she was predominantly having urinary symptoms.  She denies abnormal vaginal discharge or bleeding, and denies chance of pregnancy.  Last menstrual period was on May 10.  She has had no prior abdominal surgery by her report.  She denies history of kidney stone as well.  The patient denies recent cough, runny nose, sore throat, or other cold symptoms.  She denies shortness of breath or chest pain.  She states that she has no open skin wounds and has noted no rash, other than some recent onset of \"eczema\" in both ears.  She also does report some recent swelling of lymph nodes.    Other than frequent UTIs and idiopathic bilateral hearing loss, as well as anxiety and depression, the patient reports that she is otherwise generally healthy.  The patient states that her frequent UTIs are generally handled in the primary care setting/[facility].  She was referred out to Urology at one point, first appears to have seen a urologist in September 2017 at which time she was prescribed Septra.  More recently she reports having seen Urology through [facility] about 1 month ago approximately at that time was prescribed Hiprex.    Patient notably has adverse side effect to ciprofloxacin with nausea.    Current Facility-Administered Medications   Medication     cefTRIAXone (ROCEPHIN) 1 g vial to attach to  mL bag for ADULTS or NS 50 mL bag for PEDS     Current Outpatient Prescriptions   Medication     amoxicillin-clavulanate (AUGMENTIN) 875-125 MG per tablet     etonogestrel-ethinyl estradiol (NUVARING) 0.12-0.015 MG/24HR vaginal ring     methenamine hippurate (HIPREX) 1 G TABS tablet     Venlafaxine HCl (EFFEXOR PO)     ALPRAZolam (XANAX PO)     ascorbic acid (VITAMIN C) 500 MG tablet     HYDROXYZINE HCL PO     Past Medical History:   Diagnosis Date     Anxiety      Depressive disorder      Hearing loss     bilateral     UTI (urinary tract infection)        Past Surgical History: "   Procedure Laterality Date     ENT SURGERY      wisdom teeth extraction       Family History   Problem Relation Age of Onset     Bipolar Disorder Maternal Grandmother      Depression Paternal Aunt      Anxiety Disorder Paternal Aunt      Substance Abuse Paternal Aunt      Depression Paternal Aunt      Anxiety Disorder Paternal Aunt      Depression Maternal Uncle      Anxiety Disorder Maternal Uncle      Suicide Maternal Uncle      Substance Abuse Maternal Uncle        Social History   Substance Use Topics     Smoking status: Never Smoker     Smokeless tobacco: Never Used     Alcohol use No     Allergies   Allergen Reactions     Flu Virus Vaccine      Ciprofloxacin Nausea and Rash     Influenza Vaccines Rash     2015 vaccine rash on arm  2015 vaccine rash on arm     Lamictal [Lamotrigine] Rash and Unknown       I have reviewed the Medications, Allergies, Past Medical and Surgical History, and Social History in the Epic system.    Review of Systems   Constitutional: Positive for fever (Reported temperature maximum 100.5 F today).   HENT: Negative for congestion.    Eyes: Negative for redness.   Respiratory: Negative for shortness of breath.    Cardiovascular: Negative for chest pain.   Gastrointestinal: Positive for abdominal pain (Suprapubic). Negative for diarrhea, nausea and vomiting.   Genitourinary: Positive for dysuria, flank pain (Bilateral), frequency and urgency. Negative for difficulty urinating, hematuria, vaginal bleeding and vaginal discharge.   Musculoskeletal: Negative for arthralgias.   Skin: Negative for color change.   Neurological: Negative for headaches.   Psychiatric/Behavioral: Negative for confusion.   All other systems reviewed and are negative.      Physical Exam   BP: 120/81  Pulse: 98  Temp: 98.1  F (36.7  C)  Weight: 56.2 kg (123 lb 14.4 oz)  SpO2: 97 %      Physical Exam   Constitutional: No distress.   Thin adult female, alert, cooperative, NAD   HENT:   Head: Atraumatic.   Mouth/Throat:  Oropharynx is clear and moist. No oropharyngeal exudate.   Eyes: Pupils are equal, round, and reactive to light. No scleral icterus.   Cardiovascular: Normal rate, regular rhythm, normal heart sounds and intact distal pulses.    Pulmonary/Chest: Effort normal and breath sounds normal. No respiratory distress.   Abdominal: Soft. Bowel sounds are normal. There is tenderness.   Flat, soft.  Mild suprapubic TTP.    Musculoskeletal: She exhibits no edema or tenderness.   B CVA TTP   Skin: Skin is warm. No rash noted. She is not diaphoretic.   Nursing note and vitals reviewed.      ED Course     ED Course     Procedures             Critical Care time:  none             Results for orders placed or performed during the hospital encounter of 06/04/18 (from the past 24 hour(s))   Routine UA with microscopic   Result Value Ref Range    Color Urine Orange     Appearance Urine Slightly Cloudy     Glucose Urine Negative NEG^Negative mg/dL    Bilirubin Urine Small (A) NEG^Negative    Ketones Urine Negative NEG^Negative mg/dL    Specific Gravity Urine 1.026 1.003 - 1.035    Blood Urine Negative NEG^Negative    pH Urine 7.0 5.0 - 7.0 pH    Protein Albumin Urine 10 (A) NEG^Negative mg/dL    Urobilinogen mg/dL 4.0 (H) 0.0 - 2.0 mg/dL    Nitrite Urine Positive (A) NEG^Negative    Leukocyte Esterase Urine Moderate (A) NEG^Negative    Source Clean catch urine     WBC Urine 13 (H) 0 - 5 /HPF    RBC Urine 4 (H) 0 - 2 /HPF    Bacteria Urine Few (A) NEG^Negative /HPF    Squamous Epithelial /HPF Urine 5 (H) 0 - 1 /HPF    Mucous Urine Present (A) NEG^Negative /LPF    Amorphous Crystals Few (A) NEG^Negative /HPF   Urine Culture   Result Value Ref Range    Specimen Description Midstream Urine     Special Requests Specimen received in preservative     Culture Micro PENDING    hCG qual urine POCT   Result Value Ref Range    HCG Qual Urine Negative neg    Internal QC OK Yes    CBC with platelets differential   Result Value Ref Range    WBC 5.2 4.0  - 11.0 10e9/L    RBC Count 4.37 3.8 - 5.2 10e12/L    Hemoglobin 12.7 11.7 - 15.7 g/dL    Hematocrit 37.2 35.0 - 47.0 %    MCV 85 78 - 100 fl    MCH 29.1 26.5 - 33.0 pg    MCHC 34.1 31.5 - 36.5 g/dL    RDW 12.0 10.0 - 15.0 %    Platelet Count 180 150 - 450 10e9/L    Diff Method Automated Method     % Neutrophils 49.7 %    % Lymphocytes 43.5 %    % Monocytes 5.0 %    % Eosinophils 1.0 %    % Basophils 0.6 %    % Immature Granulocytes 0.2 %    Nucleated RBCs 0 0 /100    Absolute Neutrophil 2.6 1.6 - 8.3 10e9/L    Absolute Lymphocytes 2.3 0.8 - 5.3 10e9/L    Absolute Monocytes 0.3 0.0 - 1.3 10e9/L    Absolute Eosinophils 0.1 0.0 - 0.7 10e9/L    Absolute Basophils 0.0 0.0 - 0.2 10e9/L    Abs Immature Granulocytes 0.0 0 - 0.4 10e9/L    Absolute Nucleated RBC 0.0    Basic metabolic panel   Result Value Ref Range    Sodium 140 133 - 144 mmol/L    Potassium 3.6 3.4 - 5.3 mmol/L    Chloride 105 94 - 109 mmol/L    Carbon Dioxide 29 20 - 32 mmol/L    Anion Gap 6 3 - 14 mmol/L    Glucose 80 70 - 99 mg/dL    Urea Nitrogen 9 7 - 30 mg/dL    Creatinine 0.80 0.52 - 1.04 mg/dL    GFR Estimate 90 >60 mL/min/1.7m2    GFR Estimate If Black >90 >60 mL/min/1.7m2    Calcium 8.7 8.5 - 10.1 mg/dL              Assessments & Plan (with Medical Decision Making)   Kiah Beaver is a 23 year old female with a history of recurrent UTIs who presents to the ED today with ongoing symptoms after with and treated for a UTI in urgent care yesterday. Per Care Everywhere, patient went to [facility] urgent care clinic and was diagnosed with acute pyelonephritis. She does have a history of recurrent UTIs, last time she was treated was a month ago with Macrobid. She reportedly does not tolerate ciprofloxacin. She had a temperature of 101.1 in clinic yesterday and was discharged with Augmentin and Pyridium. She continues to have symptoms and so came to the ED today. She continues to endorse back pain, suprapubic pain with urinary frequency.  Here  in the ED she is afebrile with a temperature of 98.1. Pulse is 98 and blood pressure is 120/81. She does not appear to be in any acute distress, she does have some suprapubic tenderness to palpation as well as some bilateral CVA tenderness.    Today's UA shows 13 white cells, 4 red cells, positive nitrites, and moderate LE, with 5 squamous cells. This will be cultured. UPT is negative. Reviewing the urinalysis from yesterday at [facility], she had trace LE with negative nitrites, 6-10 white cells and 0 red cells. The urine culture form yesterday is not available in Care Everywhere as I presume it is incomplete.  If anything, the UA today is worse than the UA from yesterday (though admittedly neither one is overly impressive). We did establish IV access and did draw blood for laboratory analysis. CBC WNL, BMP WNL. Despite 24 hours of oral antibiotics she continues to have symptoms. At this point we did give her IV fluids as well as a dose of morphine for pain. We also gave her ceftriaxone for IV antibiotics (prior urine cultures have grown out E coli and staph saprophyticus). Due to ongoing symptoms, offered admission to observation unit for likely early pyelo (cannot yet say this is a failure of outpatient treatment as she has only had 2 doses of PO antibiotics/24 hours of treatment).  Obs unit HOOD in agreement with plan.    I have reviewed the nursing notes.    I have reviewed the findings, diagnosis, plan and need for follow up with the patient.    New Prescriptions    No medications on file       Final diagnoses:   Acute pyelonephritis - mild     I, Lalit Leon, am serving as a trained medical scribe to document services personally performed by Noemi García MD, based on the provider's statements to me.      INoemi MD, was physically present and have reviewed and verified the accuracy of this note documented by Lalit Leon.    6/4/2018   [facility], EMERGENCY DEPARTMENT      Noemi García MD  06/04/18 [id number]

## 2018-06-05 NOTE — ED TRIAGE NOTES
Pt arrived with symptoms of a UTI. She went to urgent care yesterday and was given a prescription for Augmentin 875. She was told to come to the ED if she continued to be febrile and have pack pain in 24 hrs. She was febrile all day today at about 100-101. She is 98.1 here and her last dose of tylenol was one hour ago. She has been dealing with this pain for about a week. She had sexual intercourse last four days ago. Denies possibility of pregnancy

## 2018-06-05 NOTE — H&P
[facility] ED Observation Admission Note    Chief Complaint   Patient presents with     Rule out Urinary Tract Infection       Assessment/Plan:  1. Pyelonephritis: UTI symptoms, bilateral flank pain and fevers. Reports a week of urinary urgency, frequency, dysuria. Fever of 100-101 in the last 2 days. Seen at [facility] urgent care clinic yesterday afternoon with UA revealing trace leukocyte esterase. D/w acute pyelonephritis and placed on Augmentin and pyridium. Took 2 doses of Augmentin as well as the pyridium without improvement. Denies nausea, vomiting, diarrhea, abnormal vaginal discharge. In ED, HR 90's, 's/80's, RR 18, SaO2 % on RA, Temp 98.1. Labs show normal BMP, CBC. BHcg negative. UA with 10 protein, positive nitrite, moderate LE, 13 WBC, 4 RBC, 5 SE, amorphous crystals present. In the ED the patient was given Rocephin 1gm IV x 1, 1L NS bolus, morphine 4mg IV x 1. She is being admitted for IV antibiotics.  - Continue Rocephin 2gm IV q24h  - MIVF at 150ml/hr  - Tylenol 1gm po q6hrs prn  - Toradol 30mg IV q6hr prn pain  - Antiemetics prn    Chronic Medical Problems:  ## Recurrent UTI: - Continue Hiprex    ## Depression/Anxiety: - Continue Effexor    HPI:    Kiah Beaver is a 23 year old female with a history of congenital bilateral hearing loss, depression, anxiety, recent recurrent UTI's who presented to the ED with UTI symptoms, bilateral flank pain and fevers. Reports a week of urinary urgency, frequency, dysuria. She developed flank pain over the last 2 days. She also reports suprapubic pain. She also developed a fever to 101 degrees F and today had a fever of 100.5. She was seen at [facility] urgent care clinic yesterday afternoon where she had urinalysis revealing trace leukocyte esterase. She was diagnosed with acute pyelonephritis and was placed on Augmentin and pyridium. She has been taking Tylenol which have been keeping her fevers down. She took 2 doses of the  Augmentin as well as the pyridium without improvement upon presentation. Denies nausea, vomiting, diarrhea, abnormal vaginal discharge, chance of pregnancy.  LMP was May 10. Over the last 10 months she has had chronic recurrent bladder infections but denies any kidney infection. Last UTI was approximately 1 month ago at which time she was treated with Macrobid. She was seen by Urology about a month ago and started on Hiprex. She was referred out to Urology at one point, first appears to have seen a Urologist in September 2017 at which time she was prescribed Septra. Thus far there has not been any investigation as to the reason for frequent UTI's.    In the ED, HR 90's, 's/80's, RR 18, SaO2 % on RA, Temp 98.1. Labs show normal BMP, CBC. BHcg negative. UA with 10 protein, positive nitrite, moderate LE, 13 WBC, 4 RBC, 5 SE, amorphous crystals present. In the ED the patient was given Rocephin 1gm IV x 1, 1L NS bolus, morphine 4mg IV x 1. She is being admitted for IV antibiotics.    On admission to the observation unit the patient was stable.    History:    Past Medical History:   Diagnosis Date     Anxiety      Depressive disorder      Hearing loss     bilateral     UTI (urinary tract infection)        Past Surgical History:   Procedure Laterality Date     ENT SURGERY      wisdom teeth extraction       Family History   Problem Relation Age of Onset     Bipolar Disorder Maternal Grandmother      Depression Paternal Aunt      Anxiety Disorder Paternal Aunt      Substance Abuse Paternal Aunt      Depression Paternal Aunt      Anxiety Disorder Paternal Aunt      Depression Maternal Uncle      Anxiety Disorder Maternal Uncle      Suicide Maternal Uncle      Substance Abuse Maternal Uncle        Social History     Social History     Marital status: Single     Spouse name: N/A     Number of children: N/A     Years of education: N/A     Occupational History     Not on file.     Social History Main Topics      Smoking status: Never Smoker     Smokeless tobacco: Never Used     Alcohol use No     Drug use: No     Sexual activity: Yes     Partners: Male     Birth control/ protection: Pill     Other Topics Concern     Not on file     Social History Narrative         No current facility-administered medications on file prior to encounter.   Current Outpatient Prescriptions on File Prior to Encounter:  etonogestrel-ethinyl estradiol (NUVARING) 0.12-0.015 MG/24HR vaginal ring Place 1 each vaginally every 28 days   methenamine hippurate (HIPREX) 1 G TABS tablet Take 1 tablet (1 g) by mouth daily   Venlafaxine HCl (EFFEXOR PO) Take 225 mg by mouth daily    ALPRAZolam (XANAX PO) Take 0.25 mg by mouth every 4 hours as needed for anxiety   ascorbic acid (VITAMIN C) 500 MG tablet Take 2 tablets (1,000 mg) by mouth daily   HYDROXYZINE HCL PO Take by mouth 4 times daily as needed for itching       Data:    Results for orders placed or performed during the hospital encounter of 06/04/18   Routine UA with microscopic   Result Value Ref Range    Color Urine Orange     Appearance Urine Slightly Cloudy     Glucose Urine Negative NEG^Negative mg/dL    Bilirubin Urine Small (A) NEG^Negative    Ketones Urine Negative NEG^Negative mg/dL    Specific Gravity Urine 1.026 1.003 - 1.035    Blood Urine Negative NEG^Negative    pH Urine 7.0 5.0 - 7.0 pH    Protein Albumin Urine 10 (A) NEG^Negative mg/dL    Urobilinogen mg/dL 4.0 (H) 0.0 - 2.0 mg/dL    Nitrite Urine Positive (A) NEG^Negative    Leukocyte Esterase Urine Moderate (A) NEG^Negative    Source Clean catch urine     WBC Urine 13 (H) 0 - 5 /HPF    RBC Urine 4 (H) 0 - 2 /HPF    Bacteria Urine Few (A) NEG^Negative /HPF    Squamous Epithelial /HPF Urine 5 (H) 0 - 1 /HPF    Mucous Urine Present (A) NEG^Negative /LPF    Amorphous Crystals Few (A) NEG^Negative /HPF   CBC with platelets differential   Result Value Ref Range    WBC 5.2 4.0 - 11.0 10e9/L    RBC Count 4.37 3.8 - 5.2 10e12/L    Hemoglobin  12.7 11.7 - 15.7 g/dL    Hematocrit 37.2 35.0 - 47.0 %    MCV 85 78 - 100 fl    MCH 29.1 26.5 - 33.0 pg    MCHC 34.1 31.5 - 36.5 g/dL    RDW 12.0 10.0 - 15.0 %    Platelet Count 180 150 - 450 10e9/L    Diff Method Automated Method     % Neutrophils 49.7 %    % Lymphocytes 43.5 %    % Monocytes 5.0 %    % Eosinophils 1.0 %    % Basophils 0.6 %    % Immature Granulocytes 0.2 %    Nucleated RBCs 0 0 /100    Absolute Neutrophil 2.6 1.6 - 8.3 10e9/L    Absolute Lymphocytes 2.3 0.8 - 5.3 10e9/L    Absolute Monocytes 0.3 0.0 - 1.3 10e9/L    Absolute Eosinophils 0.1 0.0 - 0.7 10e9/L    Absolute Basophils 0.0 0.0 - 0.2 10e9/L    Abs Immature Granulocytes 0.0 0 - 0.4 10e9/L    Absolute Nucleated RBC 0.0    Basic metabolic panel   Result Value Ref Range    Sodium 140 133 - 144 mmol/L    Potassium 3.6 3.4 - 5.3 mmol/L    Chloride 105 94 - 109 mmol/L    Carbon Dioxide 29 20 - 32 mmol/L    Anion Gap 6 3 - 14 mmol/L    Glucose 80 70 - 99 mg/dL    Urea Nitrogen 9 7 - 30 mg/dL    Creatinine 0.80 0.52 - 1.04 mg/dL    GFR Estimate 90 >60 mL/min/1.7m2    GFR Estimate If Black >90 >60 mL/min/1.7m2    Calcium 8.7 8.5 - 10.1 mg/dL   hCG qual urine POCT   Result Value Ref Range    HCG Qual Urine Negative neg    Internal QC OK Yes    Urine Culture   Result Value Ref Range    Specimen Description Midstream Urine     Special Requests Specimen received in preservative     Culture Micro PENDING         ROS:    Review Of Systems  Skin: negative  Eyes: negative  Ears/Nose/Throat: hearing loss  Respiratory: No shortness of breath, dyspnea on exertion, cough, or hemoptysis  Cardiovascular: negative  Gastrointestinal: positive for abdominal pain, negative for, nausea, vomiting and diarrhea  Genitourinary: dysuria, frequency, urgency and flank pain  Musculoskeletal: negative  Neurologic: negative  Psychiatric: negative  Hematologic/Lymphatic/Immunologic: negative  Endocrine: negative  PCP: Dr. Lockhart    10 point ROS negative other than the symptoms  noted above.      Exam:  Vitals:  B/P: 124/87, T: 98.3, P: 78, R: 18    Constitutional: healthy, alert and no distress  Head: Normocephalic. No masses, lesions, tenderness or abnormalities  Neck: Neck supple. No adenopathy. Thyroid symmetric, normal size,, Carotids without bruits.  ENT: ENT exam normal, no neck nodes or sinus tenderness  Cardiovascular: negative, PMI normal. No lifts, heaves, or thrills. RRR. No murmurs, clicks gallops or rub  Respiratory: negative, Percussion normal. Good diaphragmatic excursion. Lungs clear  Gastrointestinal: Abdomen soft, non-tender. BS normal. No masses, organomegaly. Bilateral CVA tenderness R>L  : Deferred  Musculoskeletal: extremities normal- no gross deformities noted, gait normal and normal muscle tone  Skin: no suspicious lesions or rashes  Neurologic: Gait normal. Reflexes normal and symmetric. Sensation grossly WNL.  Psychiatric: mentation appears normal and affect normal/bright  Hematologic/Lymphatic/Immunologic: normal ant/post cervical, axillary, supraclavicular and inguinal nodes    Consults: none  FEN: regular diet  DVT prophylaxis: none  GI prophylaxis: none  BM prophylaxis: none  Code Status: full  Disposition: home if labs and vitals stable, clinically improving    Signed:  Reji Nielsen PA-C  June 5, 2018 at 12:53 AM

## 2018-06-08 ENCOUNTER — OFFICE VISIT (OUTPATIENT)
Dept: UROLOGY | Facility: CLINIC | Age: 23
End: 2018-06-08
Payer: COMMERCIAL

## 2018-06-08 VITALS — HEART RATE: 104 BPM | DIASTOLIC BLOOD PRESSURE: 74 MMHG | SYSTOLIC BLOOD PRESSURE: 109 MMHG | OXYGEN SATURATION: 96 %

## 2018-06-08 DIAGNOSIS — N39.0 RECURRENT UTI: Primary | ICD-10-CM

## 2018-06-08 DIAGNOSIS — R39.89 URETHRAL PAIN: ICD-10-CM

## 2018-06-08 LAB
ALBUMIN UR-MCNC: ABNORMAL MG/DL
APPEARANCE UR: ABNORMAL
BACTERIA #/AREA URNS HPF: ABNORMAL /HPF
BILIRUB UR QL STRIP: ABNORMAL
COLOR UR AUTO: ABNORMAL
GLUCOSE UR STRIP-MCNC: ABNORMAL MG/DL
HGB UR QL STRIP: ABNORMAL
KETONES UR STRIP-MCNC: ABNORMAL MG/DL
LEUKOCYTE ESTERASE UR QL STRIP: ABNORMAL
NITRATE UR QL: ABNORMAL
NON-SQ EPI CELLS #/AREA URNS LPF: ABNORMAL /LPF
PH UR STRIP: ABNORMAL PH (ref 5–7)
RBC #/AREA URNS AUTO: ABNORMAL /HPF
SOURCE: ABNORMAL
SP GR UR STRIP: ABNORMAL (ref 1–1.03)
UROBILINOGEN UR STRIP-MCNC: ABNORMAL MG/DL (ref 0–2)
WBC #/AREA URNS AUTO: ABNORMAL /HPF

## 2018-06-08 PROCEDURE — 99213 OFFICE O/P EST LOW 20 MIN: CPT | Performed by: PHYSICIAN ASSISTANT

## 2018-06-08 PROCEDURE — 81001 URINALYSIS AUTO W/SCOPE: CPT | Performed by: PHYSICIAN ASSISTANT

## 2018-06-08 ASSESSMENT — PAIN SCALES - GENERAL: PAINLEVEL: SEVERE PAIN (6)

## 2018-06-08 NOTE — PATIENT INSTRUCTIONS
UROLOGY CLINIC VISIT PATIENT INSTRUCTIONS    1) Lidocaine gel today to see if this helps soothe urethral discomfort.    2) Finish your 10 day course of cefdinir as prescribed.    3) Ok to use Azo or pyridium as needed to help with discomfort.    4) Drink plenty of water and limit caffeine.     5) After you finish cefdinir, then start taking daily low dose cephalexin (Keflex) 250 mg once daily in the evening. This is to try to prevent UTI's. This was sent to [facility].    6) STOP taking Hiprex and Vitamin C as this does not seem to be working.    7) Use over the counter miralax or a fiber supplement to promote daily bowel movements and improve constipation which can contribute to frequent UTI's.    Follow up for the cystoscopy (scope to look into the bladder).    CYSTOSCOPY    What is a Cystoscopy?  This is a procedure done to check for problems inside the bladder.  Problems may include polyps (growths), tumors, inflammation (swelling and redness) and other concerns.    The doctor inserts a thin tube (called a cystoscope) into the bladder.  The tube is about the size of a pencil.  We will give you numbing medicine to reduce the pain or discomfort you may feel.    The tube allows the doctor to:  The doctor will be able to see inside the bladder by filling the bladder with water.  The water makes it easier to see any problems that may be present.    If needed, the doctor may use the tube to:  The doctor is able to take tissue samples (biopsies).  Samples are sent to the lab for testing.  The doctor can also burn off any small growths or tumors that are found.  This is call fulguration.    How should I get ready for the exam?  To prepare, stop taking any medications as instructed. Ask whether you should avoid eating or drinking anything after midnight before the procedure. Follow any other instructions your doctor gives you.    If you are having this procedure done at the clinic, you will be there for up to an  hour.  You will receive care before and after the procedure.    Please tell your doctor if:  - You have a history of urinary tract infections.  - You know that you have a tumor in your bladder.  - You have bleeding problems.  - You have any allergies.  - You are or may be pregnant.      What happens after the exam?  You may go back to your normal diet and activity as you feel ready, unless your doctor tells you not to.    For the next two days, you may notice:  - Some blood in your urine.  - Some burning when you urinate (use the toilet).  - An urge to urinate more often.  - Bladder spasms.    These are normal after the procedure. They should go away on their own after a day or two.      - You can help to relieve the above listed symptoms by:  - Drinking 6 to 8 large glasses of water each day (includes drinks at meals).  This will help clear the urine.  - Take warm baths to relieve pain and bladder spasms.  Do not add anything to the bath water.  - Your doctor may prescribe pain medicine.  You may also take Tylenol (acetaminophen) for pain.    When should I call my doctor?  - A fever over 100.0 F (38 C) for more than a day.  (Before you call the doctor, check your temperature under your tongue.)  - Chills.  - Failure to urinate: No urine comes out when you try to use the toilet.  (Try soaking in a bathtub full of warm water.  If still no urine, call your doctor.)  - A lot of blood in the urine or blood clots larger than a nickel.  - Pain in the back or abdomen (belly / stomach area).  - Pain or spasms that are not relieved by warm tub baths and pain medicine.  - Severe pain, burning or other problems while passing urine.  - Pain that gets worse after two days.         If you have any issues, questions or concerns in the meantime, do not hesitate to contact us at [phone] or via [facility].     It was a pleasure meeting with you today.  Thank you for allowing me and my team the privilege of caring for you today.   YOU are the reason we are here, and I truly hope we provided you with the excellent service you deserve.  Please let us know if there is anything else we can do for you so that we can be sure you are leaving completely satisfied with your care experience.

## 2018-06-08 NOTE — PROGRESS NOTES
UROLOGY OFFICE VISIT - FOLLOW UP    REASON FOR VISIT: follow up recurrent UTI    HPI: Ms. Kiah Beaver is a pleasant 23 year old female who returns to the urology clinic for follow up of recurrent UTI's. She started having frequent infections in January 2017 and had 5-6 UTI's that year. Typical symptoms include dysuria, frequency, urgency, and occasional gross hematuria. She typically goes to [facility] with infection. Saw [facility] urology in 9/2017 who prescribed post-coital Bactrim which did not work well per patient. She was then prescribed daily low dose nitrofurantoin by her primary physician which she took for 1 month but stopped when she continued to get infections. We then started her on Hiprex + vitamin C. Of note, she also had a positive ureaplasma culture so was treated with Doxycycline. Her boyfriend was also treated with Doxycycline. A repeat culture came back positive once again so she was then treated with azithromycin. Boyfriend also treated again. A CT scan in Nov 2017 showed normal  tracts without stones or hydro but moderate constipation. Urine cytology was negative for malignancy. Since starting Hiprex, she has continued to get UTI's. Most recently on 6/3/18 with a low colony count of E. Coli that was pan-sensitive. She was actually admitted to observation overnight due to concern for possible pyelonephritis. She is currently on day 4/10 of cefdinir. Continues to have moderate to severe urethral pain, not helped by pyridium. She is trying to drink plenty of water. Still consuming some caffeine. No fevers/chills.    PEx  /74 (BP Location: Left arm, Patient Position: Sitting, Cuff Size: Adult Small)  Pulse 104  LMP 05/10/2018 (Approximate)  SpO2 96%  GEN: well-appearing female in NAD  RESP: no increased respiratory effort    Urine dip could not be performed due to patient taking Azo.   Urine micro shows 5-10 WBC, 0-2 RBC, few bacteria, moderate  [facility]    Review of Prior Diagnostics:  6/4/18 - UA: moderate LE, + nitrites, 13 WBC, 4 RBC --> UC: 10-50K E. Coli (pan-sensitive)  5/9/18 - UA: trace LE, 10-24 WBC, 3-4 RBC, few bacteria --> no culture  3/2/18 - Urine cytology - negative for malignancy  2/26/18 - UA: 37 WBC, 4 RBC, few bacteria --> UC: >100k mixed  josé  11/20/17 - ureaplasma culture positive  11/17/17 - UA: trace protein, trace blood, 3-5 RBC, 3-5 WBC, moderate epith cells --> No culture  11/1/17 - UC: 50-100k E. Coli (resistant to Bactrim, o/w pan-sensitive)  11/1/17 - wet prep - positive for clue cells (treated with metronidazole)  10/16/17 - UC: 50-100k E. Coli (resistant to Bactrim, o/w pan-sensitive)  9/27/17 - UA: small LE, few bacteria --> UC: 50-100k E. Coli (resistant to Bactrim, o/w pan-sensitive)  9/27/17 - Wet prep negative; GC/CT negative  9/13/17 - UA: small blood, trace protein --> No culture  8/15/17 - UA: large blood, large LE, >100 WBC, 10-24 RBC, moderate bacteria --> UC: 10-50k mixed  josé  4/25/16 - UA: large blood, moderate LE, 50-99 WBC, 3-4 RBC --> UC: 50-100k mixed  josé    CT A/P w/IV contrast   11/17/17  IMPRESSION:  1. No acute abnormality identified.  Normal caliber appendix identified in a right lower abdominal quadrant. No acute inflammatory process identified.   2. No hydronephrosis or imaging evidence of upper tract urinary infection.   3. Moderate stool throughout nondilated large bowel.    A/P  23 year old female with a h/o recurrent UTI which has now failed post-coital Bactrim, daily nitrofurantoin, and daily methenamine + vit C. Recently admitted overnight due to concern for possible pyelonephritis (receiving treatment with cefdinir). A CT scan in Nov 2017 showed normal  tracts but moderate constipation (which could be contributing to infections). As she has continued to have infections despite various forms of prophylaxis, will plan to proceed with cystoscopy to ensure no intravesical nidus for  infections.     Plan:  -Finish current course of cefdinir as prescribed by ER physician.  -Then start daily low dose cephalexin 250 mg qhs for prophylaxis.   -Ok to use Azo/pyridium PRN.   -Given moderate to severe urethral pain today, will try administering Urojet to see if this provides any relief. If not, could also consider urethral hydrocortisone suppositories.   -Drink plenty of water and limit caffeine.   -Increase fiber in the diet or use Miralax PRN to promote daily BM and minimize constipation.   -Discussed meticulous perineal hygiene, use of a pericare bottle, etc.    Follow up for cystoscopy with Dr. Sky or Dr. An next available.     20 minutes spent with the patient, >50% in counseling and coordination of care.    Jo Marino PA-C  Department of Urology

## 2018-06-08 NOTE — NURSING NOTE
Kiah Beaver's goals for this visit include:   Chief Complaint   Patient presents with     RECHECK     Post-Hospitalization (Kidney infection)       She requests these members of her care team be copied on today's visit information: Yes    PCP: Tamiko Lockhart    Referring Provider:  No referring provider defined for this encounter.    /74 (BP Location: Left arm, Patient Position: Sitting, Cuff Size: Adult Small)  Pulse 104  LMP 05/10/2018 (Approximate)  SpO2 96%    Do you need any medication refills at today's visit? No

## 2018-06-08 NOTE — MR AVS SNAPSHOT
After Visit Summary   6/8/2018    Kiah Beaver    MRN: [id number]           Patient Information     Date Of Birth          1995        Visit Information        Provider Department      6/8/2018 8:30 AM Jo Marino PA-C [facility]        Today's Diagnoses     Recurrent UTI    -  1    Urethral pain          Care Instructions    UROLOGY CLINIC VISIT PATIENT INSTRUCTIONS    1) Lidocaine gel today to see if this helps soothe urethral discomfort.    2) Finish your 10 day course of cefdinir as prescribed.    3) Ok to use Azo or pyridium as needed to help with discomfort.    4) Drink plenty of water and limit caffeine.     5) After you finish cefdinir, then start taking daily low dose cephalexin (Keflex) 250 mg once daily in the evening. This is to try to prevent UTI's. This was sent to [facility].    6) STOP taking Hiprex and Vitamin C as this does not seem to be working.    7) Use over the counter miralax or a fiber supplement to promote daily bowel movements and improve constipation which can contribute to frequent UTI's.    Follow up for the cystoscopy (scope to look into the bladder).    CYSTOSCOPY    What is a Cystoscopy?  This is a procedure done to check for problems inside the bladder.  Problems may include polyps (growths), tumors, inflammation (swelling and redness) and other concerns.    The doctor inserts a thin tube (called a cystoscope) into the bladder.  The tube is about the size of a pencil.  We will give you numbing medicine to reduce the pain or discomfort you may feel.    The tube allows the doctor to:  The doctor will be able to see inside the bladder by filling the bladder with water.  The water makes it easier to see any problems that may be present.    If needed, the doctor may use the tube to:  The doctor is able to take tissue samples (biopsies).  Samples are sent to the lab for testing.  The doctor can also burn off any small growths or tumors  that are found.  This is call fulguration.    How should I get ready for the exam?  To prepare, stop taking any medications as instructed. Ask whether you should avoid eating or drinking anything after midnight before the procedure. Follow any other instructions your doctor gives you.    If you are having this procedure done at the clinic, you will be there for up to an hour.  You will receive care before and after the procedure.    Please tell your doctor if:  - You have a history of urinary tract infections.  - You know that you have a tumor in your bladder.  - You have bleeding problems.  - You have any allergies.  - You are or may be pregnant.      What happens after the exam?  You may go back to your normal diet and activity as you feel ready, unless your doctor tells you not to.    For the next two days, you may notice:  - Some blood in your urine.  - Some burning when you urinate (use the toilet).  - An urge to urinate more often.  - Bladder spasms.    These are normal after the procedure. They should go away on their own after a day or two.      - You can help to relieve the above listed symptoms by:  - Drinking 6 to 8 large glasses of water each day (includes drinks at meals).  This will help clear the urine.  - Take warm baths to relieve pain and bladder spasms.  Do not add anything to the bath water.  - Your doctor may prescribe pain medicine.  You may also take Tylenol (acetaminophen) for pain.    When should I call my doctor?  - A fever over 100.0 F (38 C) for more than a day.  (Before you call the doctor, check your temperature under your tongue.)  - Chills.  - Failure to urinate: No urine comes out when you try to use the toilet.  (Try soaking in a bathtub full of warm water.  If still no urine, call your doctor.)  - A lot of blood in the urine or blood clots larger than a nickel.  - Pain in the back or abdomen (belly / stomach area).  - Pain or spasms that are not relieved by warm tub baths and pain  medicine.  - Severe pain, burning or other problems while passing urine.  - Pain that gets worse after two days.         If you have any issues, questions or concerns in the meantime, do not hesitate to contact us at [phone] or via [facility].     It was a pleasure meeting with you today.  Thank you for allowing me and my team the privilege of caring for you today.  YOU are the reason we are here, and I truly hope we provided you with the excellent service you deserve.  Please let us know if there is anything else we can do for you so that we can be sure you are leaving completely satisfied with your care experience.                Follow-ups after your visit        Your next 10 appointments already scheduled     Jun 25, 2018 10:30 AM CDT   CYSTO with Owen Sky MD   [facility] ([facility])    [id number] [place] [id number] [phone]              Who to contact     If you have questions or need follow up information about today's clinic visit or your schedule please contact [facility] directly at [phone].  Normal or non-critical lab and imaging results will be communicated to you by ARDACOhart, letter or phone within 4 business days after the clinic has received the results. If you do not hear from us within 7 days, please contact the clinic through KidsLinkt or phone. If you have a critical or abnormal lab result, we will notify you by phone as soon as possible.  Submit refill requests through [facility] or call your pharmacy and they will forward the refill request to us. Please allow 3 business days for your refill to be completed.          Additional Information About Your Visit        [facility] Information     [facility] is an electronic gateway that provides easy, online access to your medical records. With [facility], you can request a clinic appointment, read your test results, renew a prescription or communicate with your care team.      To sign up for [employer] visit the website at [email or web address]   You will be asked to enter the access code listed below, as well as some personal information. Please follow the directions to create your username and password.     Your access code is: [email or web address]  Expires: 9/3/2018  2:11 PM     Your access code will  in 90 days. If you need help or a new code, please contact your [facility] or call [phone] for assistance.        Care EveryWhere ID     This is your Care EveryWhere ID. This could be used by other organizations to access your [place] medical records  [id number]        Your Vitals Were     Pulse Last Period Pulse Oximetry             104 05/10/2018 (Approximate) 96%          Blood Pressure from Last 3 Encounters:   18 109/74   18 117/77   18 114/80    Weight from Last 3 Encounters:   18 55.7 kg (122 lb 14.4 oz)   18 52.2 kg (115 lb)   17 54.4 kg (120 lb)              Today, you had the following     No orders found for display         Today's Medication Changes          These changes are accurate as of 18  9:27 AM.  If you have any questions, ask your nurse or doctor.               Start taking these medicines.        Dose/Directions    cephalexin 250 MG capsule   Commonly known as:  KEFLEX   Used for:  Recurrent UTI   Started by:  Jo Marino PA-C        Dose:  250 mg   Take 1 capsule (250 mg) by mouth At Bedtime   Quantity:  90 capsule   Refills:  0            Where to get your medicines      These medications were sent to [address] [address]     Phone:  [phone]     cephalexin 250 MG capsule                Primary Care Provider Office Phone # Fax #    Tamiko Lockhart [phone]       [facility] [address] [place] [address]        Equal Access to Services     NATALIE PAUL  AH: Hadii rema saucedoluis armandogilberto Nellie, waaxda luqadaha, qaybta kashruthi morris, luis guichoin hayaaezio reaveshank martinez odalys molina. So [facility] [phone].    ATENCIÓN: Si habla español, tiene a rod disposición servicios gratuitos de asistencia lingüística. Llame al [phone].    We comply with applicable federal civil rights laws and [place] laws. We do not discriminate on the basis of race, color, national origin, age, disability, sex, sexual orientation, or gender identity.            Thank you!     Thank you for choosing [facility]  for your care. Our goal is always to provide you with excellent care. Hearing back from our patients is one way we can continue to improve our services. Please take a few minutes to complete the written survey that you may receive in the mail after your visit with us. Thank you!             Your Updated Medication List - Protect others around you: Learn how to safely use, store and throw away your medicines at www.disposemymeds.org.          This list is accurate as of 6/8/18  9:27 AM.  Always use your most recent med list.                   Brand Name Dispense Instructions for use Diagnosis    ascorbic acid 500 MG tablet    VITAMIN C    180 tablet    Take 2 tablets (1,000 mg) by mouth daily    Recurrent UTI       cefdinir 300 MG capsule    OMNICEF    20 capsule    Take 1 capsule (300 mg) by mouth 2 times daily    Acute pyelonephritis       cephalexin 250 MG capsule    KEFLEX    90 capsule    Take 1 capsule (250 mg) by mouth At Bedtime    Recurrent UTI       EFFEXOR PO      Take 225 mg by mouth daily        etonogestrel-ethinyl estradiol 0.12-0.015 MG/24HR vaginal ring    NUVARING     Place 1 each vaginally every 28 days        methenamine hippurate 1 g Tabs tablet    HIPREX    90 tablet    Take 1 tablet (1 g) by mouth daily    Recurrent UTI       XANAX PO      Take 0.25 mg by mouth every 4 hours as needed for anxiety

## 2018-06-18 ENCOUNTER — TELEPHONE (OUTPATIENT)
Dept: UROLOGY | Facility: CLINIC | Age: 23
End: 2018-06-18

## 2018-06-18 NOTE — TELEPHONE ENCOUNTER
Patient wants to reschedule cystoscopy with Dr Sky, Urology    Per protocols, this is to be scheduled by urology care team.  [phone] (home) NONE (work)

## 2018-06-18 NOTE — TELEPHONE ENCOUNTER
Unable to contact the patient by phone. A generic message was left requesting a return call to the clinic. When patient returns the clinics call ok to schedule in any open 30 min CYSTO spot on Dr. Sky's schedule.     Lyn Edward CMA

## 2018-06-21 NOTE — TELEPHONE ENCOUNTER
The patient is scheduled on 6/25/18 with Dr. Sky for a cystoscopy. Will close encounter at this time.    Lyn Edward CMA

## 2018-07-17 ENCOUNTER — NURSE TRIAGE (OUTPATIENT)
Dept: NURSING | Facility: CLINIC | Age: 23
End: 2018-07-17
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
## 2018-07-19 ENCOUNTER — TRANSFERRED RECORDS (OUTPATIENT)
Dept: HEALTH INFORMATION MANAGEMENT | Facility: CLINIC | Age: 23
End: 2018-07-19

## 2018-07-19 ENCOUNTER — MEDICAL CORRESPONDENCE (OUTPATIENT)
Dept: HEALTH INFORMATION MANAGEMENT | Facility: CLINIC | Age: 23
End: 2018-07-19

## 2018-07-20 ENCOUNTER — TELEPHONE (OUTPATIENT)
Dept: INFECTIOUS DISEASES | Facility: CLINIC | Age: 23
End: 2018-07-20

## 2018-07-20 ENCOUNTER — MEDICAL CORRESPONDENCE (OUTPATIENT)
Dept: HEALTH INFORMATION MANAGEMENT | Facility: CLINIC | Age: 23
End: 2018-07-20

## 2018-07-20 NOTE — TELEPHONE ENCOUNTER
[facility] Call [facility]    Phone Message    May a detailed message be left on voicemail: yes    Reason for Call: Other: Please call Yovani to see when pt can be seen in ID - Yovani said Zule[facility] is working on this..      Action Taken: Message routed to:  [facility] ([facility]): Infectious Disease

## 2018-08-14 ENCOUNTER — PRE VISIT (OUTPATIENT)
Dept: UROLOGY | Facility: CLINIC | Age: 23
End: 2018-08-14

## 2018-08-14 NOTE — TELEPHONE ENCOUNTER
Patient with history of rUTIs coming in for cystoscopy. Patient chart reviewed, no need for call, all records available and ready for appointment.

## 2018-08-22 ENCOUNTER — OFFICE VISIT (OUTPATIENT)
Dept: UROLOGY | Facility: CLINIC | Age: 23
End: 2018-08-22
Payer: COMMERCIAL

## 2018-08-22 VITALS
DIASTOLIC BLOOD PRESSURE: 89 MMHG | HEIGHT: 64 IN | SYSTOLIC BLOOD PRESSURE: 125 MMHG | HEART RATE: 98 BPM | WEIGHT: 122 LBS | BODY MASS INDEX: 20.83 KG/M2

## 2018-08-22 DIAGNOSIS — N39.0 RECURRENT UTI: Primary | ICD-10-CM

## 2018-08-22 ASSESSMENT — PAIN SCALES - GENERAL: PAINLEVEL: NO PAIN (0)

## 2018-08-22 NOTE — MR AVS SNAPSHOT
"              After Visit Summary   8/22/2018    Kiah Beaver    MRN: [id number]           Patient Information     Date Of Birth          1995        Visit Information        Provider Department      8/22/2018 9:30 AM Owen Sky MD [facility] Urology and [facility]        Today's Diagnoses     Recurrent UTI    -  1      Care Instructions      AFTER YOUR CYSTOSCOPY        You have just completed a cystoscopy, or \"cysto\", which allowed your physician to learn more about your bladder (or to remove a stent placed after surgery). We suggest that you continue to avoid caffeine, fruit juice, and alcohol for the next 24 hours, however, you are encouraged to return to your normal activities.         A few things that are considered normal after your cystoscopy:     * Small amount of bleeding (or spotting) that clears within the next 24 hours     * Slight burning sensation with urination     * Sensation to of needing to avoid more frequently     * The feeling of \"air\" in your urine     * Mild discomfort that is relieved with Tylenol        Please contact our office promptly if you:     * Develop a fever above 101 degrees     * Are unable to urinate     * Develop bright red blood that does not stop     * Severe pain or swelling         Please contact our office with any concerns or questions @[facility].          Follow-ups after your visit        Follow-up notes from your care team     Return in 3 months (on 11/22/2018) for with Jo.      Your next 10 appointments already scheduled     Nov 26, 2018  4:30 PM CST   (Arrive by 4:15 PM)   Return Visit with DINESH Nava [facility] Urology and [facility] ([facility])    [address] [place] [address] [phone]              Who to contact     Please call your clinic at [phone] to:    Ask questions about your health    Make or cancel " "appointments    Discuss your medicines    Learn about your test results    Speak to your doctor            Additional Information About Your Visit        MyChart Information     [facility] is an electronic gateway that provides easy, online access to your medical records. With [facility], you can request a clinic appointment, read your test results, renew a prescription or communicate with your care team.     To sign up for Attainia visit the website at [email or web address]   You will be asked to enter the access code listed below, as well as some personal information. Please follow the directions to create your username and password.     Your access code is: [email or web address]  Expires: 2018  9:22 AM     Your access code will  in 90 days. If you need help or a new code, please contact your [facility] or call [phone] for assistance.        Care EveryWhere ID     This is your Care EveryWhere ID. This could be used by other organizations to access your [place] medical records  [id number]        Your Vitals Were     Pulse Height BMI (Body Mass Index)             98 1.626 m (5' 4\") 20.94 kg/m2          Blood Pressure from Last 3 Encounters:   18 125/89   18 109/74   18 117/77    Weight from Last 3 Encounters:   18 55.3 kg (122 lb)   18 55.7 kg (122 lb 14.4 oz)   18 52.2 kg (115 lb)              We Performed the Following     CYSTOURETHROSCOPY        Primary Care Provider Office Phone # Fax #    Tamiko HEAD [place] [phone]       [facility] [address] [place] [address]        Equal Access to Services     CANDIE PAUL : Hadii rema mendosa hadasho Soshubhamali, waaxda luqadaha, qaybta kaalmada adeegyada, luis molina. So [facility] [phone].    ATENCIÓN: Si habla español, tiene a rod disposición servicios gratuitos de asistencia lingüística. Llame al [phone].    We comply with " applicable federal civil rights laws and [place] laws. We do not discriminate on the basis of race, color, national origin, age, disability, sex, sexual orientation, or gender identity.            Thank you!     Thank you for choosing [facility] UROLOGY AND [facility]  for your care. Our goal is always to provide you with excellent care. Hearing back from our patients is one way we can continue to improve our services. Please take a few minutes to complete the written survey that you may receive in the mail after your visit with us. Thank you!             Your Updated Medication List - Protect others around you: Learn how to safely use, store and throw away your medicines at www.disposemymeds.org.          This list is accurate as of 8/22/18 10:14 AM.  Always use your most recent med list.                   Brand Name Dispense Instructions for use Diagnosis    ascorbic acid 500 MG tablet    VITAMIN C    180 tablet    Take 2 tablets (1,000 mg) by mouth daily    Recurrent UTI       cefdinir 300 MG capsule    OMNICEF    20 capsule    Take 1 capsule (300 mg) by mouth 2 times daily    Acute pyelonephritis       cephalexin 250 MG capsule    KEFLEX    90 capsule    Take 1 capsule (250 mg) by mouth At Bedtime    Recurrent UTI       EFFEXOR PO      Take 225 mg by mouth daily        etonogestrel-ethinyl estradiol 0.12-0.015 MG/24HR vaginal ring    NUVARING     Place 1 each vaginally every 28 days        methenamine hippurate 1 g Tabs tablet    HIPREX    90 tablet    Take 1 tablet (1 g) by mouth daily    Recurrent UTI       XANAX PO      Take 0.25 mg by mouth every 4 hours as needed for anxiety

## 2018-08-22 NOTE — NURSING NOTE
"Chief Complaint   Patient presents with     Cystoscopy     rUTIs       Blood pressure 125/89, pulse 98, height 1.626 m (5' 4\"), weight 55.3 kg (122 lb), not currently breastfeeding. Body mass index is 20.94 kg/(m^2).    Patient Active Problem List   Diagnosis     Suicidal ideation     MDD (major depressive disorder), severe (H)     RONALD (generalized anxiety disorder)     Major depressive disorder     Pyelonephritis       Allergies   Allergen Reactions     Ciprofloxacin Nausea and Rash     Influenza Vaccines Rash      vaccine rash on arm   vaccine rash on arm     Lamictal [Lamotrigine] Rash and Unknown       Current Outpatient Prescriptions   Medication Sig Dispense Refill     ALPRAZolam (XANAX PO) Take 0.25 mg by mouth every 4 hours as needed for anxiety       ascorbic acid (VITAMIN C) 500 MG tablet Take 2 tablets (1,000 mg) by mouth daily 180 tablet 1     cefdinir (OMNICEF) 300 MG capsule Take 1 capsule (300 mg) by mouth 2 times daily 20 capsule 0     cephalexin (KEFLEX) 250 MG capsule Take 1 capsule (250 mg) by mouth At Bedtime 90 capsule 0     etonogestrel-ethinyl estradiol (NUVARING) 0.12-0.015 MG/24HR vaginal ring Place 1 each vaginally every 28 days       methenamine hippurate (HIPREX) 1 G TABS tablet Take 1 tablet (1 g) by mouth daily 90 tablet 1     Venlafaxine HCl (EFFEXOR PO) Take 225 mg by mouth daily          Social History   Substance Use Topics     Smoking status: Never Smoker     Smokeless tobacco: Never Used     Alcohol use No     Invasive Procedure Safety Checklist:    Procedure: cystoscopy    Action: Complete sections and checkboxes as appropriate.    Pre-procedure:  1. Patient ID Verified with 2 identifiers (Marjorie and  or MRN) : YES    2. Procedure and site verified with patient/designee (when able) : YES    3. Accurate consent documentation in medical record : YES    4. H&P (or appropriate assessment) documented in medical record : NO  H&P must be up to 30 days prior to procedure an " updated within 24 hours of                 Procedure as applicable.     5. Relevant diagnostic and radiology test results appropriately labeled and displayed as applicable : NO    6. Blood products, implants, devices, and/or special equipment available for the procedure as applicable : NO    7. Procedure site(s) marked with provider initials [Exclusions: none] : NO    8. Marking not required. Reason : Yes  Procedure does not require site marking    Time Out:     Time-Out performed immediately prior to starting procedure, including verbal and active participation of all team members addressing: YES    1. Correct patient identity.  2. Confirmed that the correct side and site are marked.  3. An accurate procedure to be done.  4. Agreement on the procedure to be done.  5. Correct patient position.  6. Relevant images and results are properly labeled and appropriately displayed.  7. The need to administer antibiotics or fluids for irrigation purposes during the procedure as applicable.  8. Safety precautions based on patient history or medication use.    During Procedure: Verification of correct person, site, and procedure occurs any time the responsibility for care of the patient is transferred to another member of the care team.    Kenia Osorio LPN  8/22/2018  9:32 AM      The following medication was given:     MEDICATION:  Lidocaine without epinephrine  ROUTE: urethral  SITE: urethral  DOSE: 10 mL (200 mg)  LOT #: [id number]  : [employer]  EXPIRATION DATE: 4/2020  NDC#: [address]-6   Was there drug waste? No      Kenia Osorio LPN  August 22, 2018

## 2018-08-22 NOTE — PROGRESS NOTES
Continue to increase diet as tolerated, continue with protein supplement shakes  Patient still with very poor p o  intake, not maintaining adequate oral hydration, see notes below  Reason for Visit:  Cystoscopy    Clinical Data: Ms. Kiah Beaver is a 23 year old female with a hx of recurrent uti's.  She is on daily cephalexin 250 mg for prophylaxis.  She was also having some pain in the urethra that has improved.    Cystoscopy procedure:  Pt. Was consented and placed in the lithotomy position.  She was cleaned and preparred in the usual fashion.  Lidocain gel was inserted into the urethra and given time to take effect.  A 16 fr flexible cystoscope was then inserted through the urethra and into the bladder.  The urethra was wnl.  The bladder was with 1+ trabeculation.  No tumors, diverticulae, or stones.  There is some trigonitis and some erythema in the distal urethra. Bilateral u/o's were effluxing clear urine.  The cystoscope was then withdrawn.  The pt. Tolerated the procedure well.    A/P:  23 year old female with some trigonitis and possible urethritis.  Would continue with the prophylaxis and increase fluid intake.  -continue keflex  -increase fluid intake  -f/u in 3 months with Jo.    Thank you for allowing me to participate in the care of  Ms. Kiah Beaver and I will keep you updated on her progress.    Owen Sky MD

## 2018-08-22 NOTE — LETTER
8/22/2018       RE: Kiah Beaver  [address]  [facility] [address]     Dear Colleague,    Thank you for referring your patient, Kiah Beaver, to the [facility] UROLOGY AND INST FOR PROSTATE AND UROLOGIC CANCERS at [facility]. Please see a copy of my visit note below.    Reason for Visit:  Cystoscopy    Clinical Data: Ms. Kiah Beaver is a 23 year old female with a hx of recurrent uti's.  She is on daily cephalexin 250 mg for prophylaxis.  She was also having some pain in the urethra that has improved.    Cystoscopy procedure:  Pt. Was consented and placed in the lithotomy position.  She was cleaned and preparred in the usual fashion.  Lidocain gel was inserted into the urethra and given time to take effect.  A 16 fr flexible cystoscope was then inserted through the urethra and into the bladder.  The urethra was wnl.  The bladder was with 1+ trabeculation.  No tumors, diverticulae, or stones.  There is some trigonitis and some erythema in the distal urethra. Bilateral u/o's were effluxing clear urine.  The cystoscope was then withdrawn.  The pt. Tolerated the procedure well.    A/P:  23 year old female with some trigonitis and possible urethritis.  Would continue with the prophylaxis and increase fluid intake.  -continue keflex  -increase fluid intake  -f/u in 3 months with Jo.    Thank you for allowing me to participate in the care of  Ms. Kiah Beaver and I will keep you updated on her progress.    Owen Sky MD

## 2018-08-27 ENCOUNTER — HEALTH MAINTENANCE LETTER (OUTPATIENT)
Age: 23
End: 2018-08-27

## 2018-11-14 ENCOUNTER — PRE VISIT (OUTPATIENT)
Dept: UROLOGY | Facility: CLINIC | Age: 23
End: 2018-11-14

## 2018-11-26 ENCOUNTER — OFFICE VISIT (OUTPATIENT)
Dept: UROLOGY | Facility: CLINIC | Age: 23
End: 2018-11-26
Payer: COMMERCIAL

## 2018-11-26 VITALS
HEART RATE: 85 BPM | WEIGHT: 117 LBS | BODY MASS INDEX: 19.97 KG/M2 | DIASTOLIC BLOOD PRESSURE: 73 MMHG | HEIGHT: 64 IN | SYSTOLIC BLOOD PRESSURE: 115 MMHG

## 2018-11-26 DIAGNOSIS — N39.3 FEMALE STRESS INCONTINENCE: ICD-10-CM

## 2018-11-26 DIAGNOSIS — N39.0 RECURRENT UTI: Primary | ICD-10-CM

## 2018-11-26 DIAGNOSIS — M62.89 PELVIC FLOOR DYSFUNCTION: ICD-10-CM

## 2018-11-26 ASSESSMENT — PAIN SCALES - GENERAL: PAINLEVEL: NO PAIN (0)

## 2018-11-26 NOTE — MR AVS SNAPSHOT
After Visit Summary   11/26/2018    Kiah Beaver    MRN: [id number]           Patient Information     Date Of Birth          1995        Visit Information        Provider Department      11/26/2018 4:30 PM Jo Marino PA-C [facility] Urology and [facility] for Prostate and Urologic Cancers        Today's Diagnoses     Pelvic floor dysfunction    -  1    Recurrent UTI        Female stress incontinence          Care Instructions    UROLOGY CLINIC VISIT PATIENT INSTRUCTIONS    Follow up with me (Jo Marino PA-C) in 4 months to recheck.    Physical Therapy Referral    Please call [phone] to make an appointment     [place] [facility] - [email or web address]  [facility] - [email or web address]    Locations:    [facility] - U-Ortho PT [facility] [place]   FSOC [facility] PT FSOC [facility] PT FSOC [facility] FSOC [place]         If you have any issues, questions or concerns in the meantime, do not hesitate to contact us at [phone] or via [employer].     It was a pleasure meeting with you today.  Thank you for allowing me and my team the privilege of caring for you today.  YOU are the reason we are here, and I truly hope we provided you with the excellent service you deserve.  Please let us know if there is anything else we can do for you so that we can be sure you are leaving completely satisfied with your care experience.                Follow-ups after your visit        Additional Services     [facility] Physical Therapy Referral       Physical  Therapy, Hand Therapy and Chiropractic Care are available through:  *[place] [facility]  *Hand Therapy (Occupational Therapy or Physical Therapy)  *[facility]    Call one number to schedule at any of the above locations: [phone].    Physical therapy, Hand therapy and/or Chiropractic care has been recommended by your physician as an excellent treatment option to reduce pain and help people return to normal activities, including sports.  Therapy and/or chiropractic care services are a great complement or alternative to expensive and invasive surgery, injections, or long-term use of prescription medications. The primary goal is to identify the underlying problem and provide you the tools to manage your condition on your own.     Please be aware that coverage of these services is subject to the terms and limitations of your health insurance plan.  Call member services at your health plan with any benefit or coverage questions.      Please bring the following to your appointment:  *Your personal calendar for scheduling future appointments  *Comfortable clothing                  Your next 10 appointments already scheduled     Mar 26, 2019 10:00 AM CDT   (Arrive by 9:45 AM)   Return Visit with Jo Marino PA-C   [facility] Urology and Inst for Prostate and Urologic Cancers ([facility] Clinics and [facility])    [address]  [address] [facility] [address] [phone]              Future tests that were ordered for you today     Open Future Orders        Priority Expected Expires Ordered    [facility] Physical Therapy Referral Routine  11/26/2019 11/26/2018            Who to contact     Please call your clinic at [phone] to:    Ask questions about your health    Make or cancel appointments    Discuss your medicines    Learn about your test results    Speak to your doctor            Additional Information About Your Visit        MyChart Information      "I-DISPO gives you secure access to your electronic health record. If you see a primary care provider, you can also send messages to your care team and make appointments. If you have questions, please call your primary care clinic.  If you do not have a primary care provider, please call [phone] and they will assist you.      I-DISPO is an electronic gateway that provides easy, online access to your medical records. With I-DISPO, you can request a clinic appointment, read your test results, renew a prescription or communicate with your care team.     To access your existing account, please contact your [facility] or call [phone] for assistance.        Care EveryWhere ID     This is your Care EveryWhere ID. This could be used by other organizations to access your [facility] medical records  [id number]        Your Vitals Were     Pulse Height BMI (Body Mass Index)             85 1.626 m (5' 4\") 20.08 kg/m2          Blood Pressure from Last 3 Encounters:   11/26/18 115/73   08/22/18 125/89   06/08/18 109/74    Weight from Last 3 Encounters:   11/26/18 53.1 kg (117 lb)   08/22/18 55.3 kg (122 lb)   06/05/18 55.7 kg (122 lb 14.4 oz)                 Today's Medication Changes          These changes are accurate as of 11/26/18  4:47 PM.  If you have any questions, ask your nurse or doctor.               Stop taking these medicines if you haven't already. Please contact your care team if you have questions.     methenamine 1 g tablet   Commonly known as:  HIPREX   Stopped by:  Jo Marino PA-C           vitamin C 500 MG tablet   Commonly known as:  ASCORBIC ACID   Stopped by:  Jo Marino PA-C                Where to get your medicines      These medications were sent to [place], [place] - [address] [place] [id number]     Phone:  [phone]     cephalexin 250 MG capsule                Primary Care Provider " Office Phone # Fax #    Tamiko HEAD Chantelle [phone]       [facility] [address] [place] [address]        Equal Access to Services     NEELAMCANDIE MENJIVARJENNIFER : Hadii aad ku hadluis armandoo Soomaali, waaxda luqadaha, qaybta kaalmada adeegyada, luis vaughnn kana martinez lavickezio jesse. So [facility] [phone].    ATENCIÓN: Si habla español, tiene a rod disposición servicios gratuitos de asistencia lingüística. Llame al [phone].    We comply with applicable federal civil rights laws and [place] laws. We do not discriminate on the basis of race, color, national origin, age, disability, sex, sexual orientation, or gender identity.            Thank you!     Thank you for choosing [facility] UROLOGY AND [facility] FOR PROSTATE AND UROLOGIC CANCERS  for your care. Our goal is always to provide you with excellent care. Hearing back from our patients is one way we can continue to improve our services. Please take a few minutes to complete the written survey that you may receive in the mail after your visit with us. Thank you!             Your Updated Medication List - Protect others around you: Learn how to safely use, store and throw away your medicines at www.disposemymeds.org.          This list is accurate as of 11/26/18  4:47 PM.  Always use your most recent med list.                   Brand Name Dispense Instructions for use Diagnosis    cephalexin 250 MG capsule    KEFLEX    90 capsule    Take 1 capsule (250 mg) by mouth At Bedtime    Recurrent UTI       EFFEXOR PO      Take 225 mg by mouth daily        etonogestrel-ethinyl estradiol 0.12-0.015 MG/24HR vaginal ring    NUVARING     Place 1 each vaginally every 28 days        XANAX PO      Take 0.25 mg by mouth every 4 hours as needed for anxiety
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
## 2018-11-26 NOTE — PATIENT INSTRUCTIONS
UROLOGY CLINIC VISIT PATIENT INSTRUCTIONS    Follow up with me (Jo Marino PA-C) in 4 months to recheck.    Physical Therapy Referral    Please call [phone] to make an appointment     [place] [facility] - [email or web address]  [facility] - [email or web address]    Locations:    [facility] - U-Ortho PT [facility] [place]   FSOC [facility] PT FS[facility] PT FS[facility]         If you have any issues, questions or concerns in the meantime, do not hesitate to contact us at [phone] or via [employer].     It was a pleasure meeting with you today.  Thank you for allowing me and my team the privilege of caring for you today.  YOU are the reason we are here, and I truly hope we provided you with the excellent service you deserve.  Please let us know if there is anything else we can do for you so that we can be sure you are leaving completely satisfied with your care experience.

## 2019-03-13 ENCOUNTER — PRE VISIT (OUTPATIENT)
Dept: UROLOGY | Facility: CLINIC | Age: 24
End: 2019-03-13

## 2019-03-13 NOTE — TELEPHONE ENCOUNTER
Chief Complaint : 4 month PT f/u      New Hx/Sx: UTI     Records/Orders/Proced: none     Pt Contacted: no     At Rooming: normal

## 2019-04-10 ENCOUNTER — PRE VISIT (OUTPATIENT)
Dept: UROLOGY | Facility: CLINIC | Age: 24
End: 2019-04-10

## 2019-04-23 ENCOUNTER — OFFICE VISIT (OUTPATIENT)
Dept: UROLOGY | Facility: CLINIC | Age: 24
End: 2019-04-23
Payer: COMMERCIAL

## 2019-04-23 VITALS
HEART RATE: 92 BPM | BODY MASS INDEX: 19.63 KG/M2 | DIASTOLIC BLOOD PRESSURE: 86 MMHG | SYSTOLIC BLOOD PRESSURE: 120 MMHG | HEIGHT: 64 IN | WEIGHT: 115 LBS

## 2019-04-23 DIAGNOSIS — N32.89 BLADDER SPASMS: ICD-10-CM

## 2019-04-23 DIAGNOSIS — M62.89 PELVIC FLOOR DYSFUNCTION: ICD-10-CM

## 2019-04-23 DIAGNOSIS — N39.0 RECURRENT UTI: Primary | ICD-10-CM

## 2019-04-23 RX ORDER — OXYBUTYNIN CHLORIDE 5 MG/1
5 TABLET, EXTENDED RELEASE ORAL DAILY
Qty: 30 TABLET | Refills: 3 | Status: SHIPPED | OUTPATIENT
Start: 2019-04-23

## 2019-04-23 ASSESSMENT — MIFFLIN-ST. JEOR: SCORE: 1266.64

## 2019-04-23 ASSESSMENT — PAIN SCALES - GENERAL: PAINLEVEL: MODERATE PAIN (4)

## 2019-04-23 NOTE — PATIENT INSTRUCTIONS
UROLOGY CLINIC VISIT PATIENT INSTRUCTIONS    1) Follow up with Jo Marino PA-C in 2 months to recheck one more time before you leave for [place].     2) Start taking oxybutynin XL 5 mg once daily in the morning to help with bladder and urethral spasms/cramping. This was sent to [facility].  -Call if you notice bothersome dry eyes or dry mouth which can be side effects with this medication.    3) Continue taking the low dose antibiotic once daily.    If you have any issues, questions or concerns in the meantime, do not hesitate to contact us at [phone] or via [employer].     It was a pleasure meeting with you today.  Thank you for allowing me and my team the privilege of caring for you today.  YOU are the reason we are here, and I truly hope we provided you with the excellent service you deserve.  Please let us know if there is anything else we can do for you so that we can be sure you are leaving completely satisfied with your care experience.
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
## 2019-06-12 ENCOUNTER — PRE VISIT (OUTPATIENT)
Dept: UROLOGY | Facility: CLINIC | Age: 24
End: 2019-06-12

## 2019-06-12 NOTE — TELEPHONE ENCOUNTER
Chief Complaint : 2 month recheck    Records/Orders: na    Pt Contacted: no    At Rooming: normal

## 2019-06-25 ENCOUNTER — OFFICE VISIT (OUTPATIENT)
Dept: UROLOGY | Facility: CLINIC | Age: 24
End: 2019-06-25
Payer: COMMERCIAL

## 2019-06-25 VITALS
BODY MASS INDEX: 19.97 KG/M2 | DIASTOLIC BLOOD PRESSURE: 88 MMHG | HEIGHT: 64 IN | SYSTOLIC BLOOD PRESSURE: 127 MMHG | WEIGHT: 117 LBS | HEART RATE: 80 BPM

## 2019-06-25 DIAGNOSIS — N32.89 BLADDER SPASMS: ICD-10-CM

## 2019-06-25 DIAGNOSIS — N39.0 RECURRENT UTI: Primary | ICD-10-CM

## 2019-06-25 DIAGNOSIS — M62.89 PELVIC FLOOR DYSFUNCTION: ICD-10-CM

## 2019-06-25 ASSESSMENT — MIFFLIN-ST. JEOR: SCORE: 1275.71

## 2019-06-25 ASSESSMENT — PAIN SCALES - GENERAL: PAINLEVEL: NO PAIN (0)

## 2019-06-25 ASSESSMENT — PATIENT HEALTH QUESTIONNAIRE - PHQ9
SUM OF ALL RESPONSES TO PHQ QUESTIONS 1-9: 8
10. IF YOU CHECKED OFF ANY PROBLEMS, HOW DIFFICULT HAVE THESE PROBLEMS MADE IT FOR YOU TO DO YOUR WORK, TAKE CARE OF THINGS AT HOME, OR GET ALONG WITH OTHER PEOPLE: SOMEWHAT DIFFICULT
SUM OF ALL RESPONSES TO PHQ QUESTIONS 1-9: 8
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
## 2019-06-26 ASSESSMENT — PATIENT HEALTH QUESTIONNAIRE - PHQ9: SUM OF ALL RESPONSES TO PHQ QUESTIONS 1-9: 8

## 2020-03-02 ENCOUNTER — HEALTH MAINTENANCE LETTER (OUTPATIENT)
Age: 25
End: 2020-03-02

## 2020-12-14 ENCOUNTER — HEALTH MAINTENANCE LETTER (OUTPATIENT)
Age: 25
End: 2020-12-14

## 2021-03-29 NOTE — PATIENT INSTRUCTIONS

## 2021-04-18 ENCOUNTER — HEALTH MAINTENANCE LETTER (OUTPATIENT)
Age: 26
End: 2021-04-18

## 2021-10-02 ENCOUNTER — HEALTH MAINTENANCE LETTER (OUTPATIENT)
Age: 26
End: 2021-10-02

## 2022-03-05 NOTE — ED NOTES
Pt with bilateral flank pain, urinary frequency, dysuria, and headache x 2 weeks. She was recently treated for UTI but says she does not feel like it cleared up. Pt says she often gets UTIs, at least once per month.   No assistance needed

## 2022-05-14 ENCOUNTER — HEALTH MAINTENANCE LETTER (OUTPATIENT)
Age: 27
End: 2022-05-14

## 2022-06-03 NOTE — NURSING NOTE
Chief Complaint   Patient presents with     Consult     UTI's       Lisa Cristobal MA   RX sent inform pt

## 2022-09-03 ENCOUNTER — HEALTH MAINTENANCE LETTER (OUTPATIENT)
Age: 27
End: 2022-09-03

## 2023-06-03 ENCOUNTER — HEALTH MAINTENANCE LETTER (OUTPATIENT)
Age: 28
End: 2023-06-03